# Patient Record
Sex: MALE | Race: WHITE | NOT HISPANIC OR LATINO
[De-identification: names, ages, dates, MRNs, and addresses within clinical notes are randomized per-mention and may not be internally consistent; named-entity substitution may affect disease eponyms.]

---

## 2017-03-03 ENCOUNTER — NON-APPOINTMENT (OUTPATIENT)
Age: 62
End: 2017-03-03

## 2017-03-03 ENCOUNTER — APPOINTMENT (OUTPATIENT)
Dept: INTERNAL MEDICINE | Facility: CLINIC | Age: 62
End: 2017-03-03

## 2017-03-03 VITALS — DIASTOLIC BLOOD PRESSURE: 60 MMHG | SYSTOLIC BLOOD PRESSURE: 120 MMHG

## 2017-03-03 VITALS — HEIGHT: 68 IN | WEIGHT: 204 LBS | BODY MASS INDEX: 30.92 KG/M2 | TEMPERATURE: 98.8 F

## 2017-03-03 RX ORDER — AMOXICILLIN AND CLAVULANATE POTASSIUM 875; 125 MG/1; MG/1
875-125 TABLET, COATED ORAL
Qty: 20 | Refills: 0 | Status: DISCONTINUED | COMMUNITY
Start: 2016-12-13

## 2017-03-06 LAB — BACTERIA NOSE AEROBE CULT: NORMAL

## 2017-03-07 ENCOUNTER — APPOINTMENT (OUTPATIENT)
Dept: INTERNAL MEDICINE | Facility: CLINIC | Age: 62
End: 2017-03-07

## 2017-03-07 ENCOUNTER — LABORATORY RESULT (OUTPATIENT)
Age: 62
End: 2017-03-07

## 2017-03-07 VITALS — WEIGHT: 201 LBS | HEIGHT: 68 IN | TEMPERATURE: 97.4 F | BODY MASS INDEX: 30.46 KG/M2

## 2017-03-07 DIAGNOSIS — Z87.09 PERSONAL HISTORY OF OTHER DISEASES OF THE RESPIRATORY SYSTEM: ICD-10-CM

## 2017-03-08 LAB
ALBUMIN SERPL ELPH-MCNC: 4.4 G/DL
ALP BLD-CCNC: 66 U/L
ALT SERPL-CCNC: 27 U/L
ANION GAP SERPL CALC-SCNC: 19 MMOL/L
AST SERPL-CCNC: 20 U/L
BILIRUB SERPL-MCNC: 0.3 MG/DL
BUN SERPL-MCNC: 22 MG/DL
CALCIUM SERPL-MCNC: 10.7 MG/DL
CHLORIDE SERPL-SCNC: 99 MMOL/L
CHOLEST SERPL-MCNC: 180 MG/DL
CHOLEST/HDLC SERPL: 3.7 RATIO
CK SERPL-CCNC: 57 U/L
CO2 SERPL-SCNC: 26 MMOL/L
CREAT SERPL-MCNC: 1.13 MG/DL
GGT SERPL-CCNC: 55 U/L
HDLC SERPL-MCNC: 49 MG/DL
LDLC SERPL CALC-MCNC: 96 MG/DL
POTASSIUM SERPL-SCNC: 4.3 MMOL/L
PROT SERPL-MCNC: 7.3 G/DL
SAVE SPECIMEN: NORMAL
SODIUM SERPL-SCNC: 144 MMOL/L
TRIGL SERPL-MCNC: 175 MG/DL

## 2017-03-09 ENCOUNTER — RX RENEWAL (OUTPATIENT)
Age: 62
End: 2017-03-09

## 2017-03-11 LAB — BACTERIA THROAT CULT: NORMAL

## 2017-09-03 ENCOUNTER — RX RENEWAL (OUTPATIENT)
Age: 62
End: 2017-09-03

## 2017-11-28 ENCOUNTER — RX RENEWAL (OUTPATIENT)
Age: 62
End: 2017-11-28

## 2018-03-02 ENCOUNTER — RX RENEWAL (OUTPATIENT)
Age: 63
End: 2018-03-02

## 2018-06-19 ENCOUNTER — APPOINTMENT (OUTPATIENT)
Dept: UROLOGY | Facility: CLINIC | Age: 63
End: 2018-06-19
Payer: COMMERCIAL

## 2018-06-19 VITALS
TEMPERATURE: 98.7 F | WEIGHT: 210 LBS | HEART RATE: 97 BPM | RESPIRATION RATE: 16 BRPM | SYSTOLIC BLOOD PRESSURE: 133 MMHG | DIASTOLIC BLOOD PRESSURE: 90 MMHG | HEIGHT: 69 IN | BODY MASS INDEX: 31.1 KG/M2

## 2018-06-19 DIAGNOSIS — Z80.3 FAMILY HISTORY OF MALIGNANT NEOPLASM OF BREAST: ICD-10-CM

## 2018-06-19 DIAGNOSIS — R36.1 HEMATOSPERMIA: ICD-10-CM

## 2018-06-19 DIAGNOSIS — Z85.79 PERSONAL HISTORY OF OTHER MALIGNANT NEOPLASMS OF LYMPHOID, HEMATOPOIETIC AND RELATED TISSUES: ICD-10-CM

## 2018-06-19 DIAGNOSIS — Z84.1 FAMILY HISTORY OF DISORDERS OF KIDNEY AND URETER: ICD-10-CM

## 2018-06-19 PROCEDURE — 99203 OFFICE O/P NEW LOW 30 MIN: CPT

## 2018-06-20 LAB
APPEARANCE: CLEAR
BACTERIA: NEGATIVE
BILIRUBIN URINE: NEGATIVE
BLOOD URINE: NEGATIVE
COLOR: YELLOW
GLUCOSE QUALITATIVE U: NEGATIVE MG/DL
HYALINE CASTS: 1 /LPF
KETONES URINE: NEGATIVE
LEUKOCYTE ESTERASE URINE: NEGATIVE
MICROSCOPIC-UA: NORMAL
NITRITE URINE: NEGATIVE
PH URINE: 5
PROTEIN URINE: NEGATIVE MG/DL
RED BLOOD CELLS URINE: 2 /HPF
SPECIFIC GRAVITY URINE: 1.02
SQUAMOUS EPITHELIAL CELLS: 1 /HPF
UROBILINOGEN URINE: NEGATIVE MG/DL
WHITE BLOOD CELLS URINE: 1 /HPF

## 2018-07-05 ENCOUNTER — RECORD ABSTRACTING (OUTPATIENT)
Age: 63
End: 2018-07-05

## 2018-07-05 ENCOUNTER — APPOINTMENT (OUTPATIENT)
Dept: INTERNAL MEDICINE | Facility: CLINIC | Age: 63
End: 2018-07-05
Payer: COMMERCIAL

## 2018-07-05 PROCEDURE — 36415 COLL VENOUS BLD VENIPUNCTURE: CPT

## 2018-07-07 LAB
PSA SERPL-MCNC: 1.17 NG/ML
SAVE SPECIMEN: NORMAL

## 2018-09-04 ENCOUNTER — RX RENEWAL (OUTPATIENT)
Age: 63
End: 2018-09-04

## 2018-12-28 ENCOUNTER — APPOINTMENT (OUTPATIENT)
Dept: INTERNAL MEDICINE | Facility: CLINIC | Age: 63
End: 2018-12-28
Payer: COMMERCIAL

## 2018-12-28 VITALS
DIASTOLIC BLOOD PRESSURE: 96 MMHG | WEIGHT: 214 LBS | HEIGHT: 69 IN | HEART RATE: 90 BPM | SYSTOLIC BLOOD PRESSURE: 141 MMHG | BODY MASS INDEX: 31.7 KG/M2

## 2018-12-28 PROCEDURE — 99213 OFFICE O/P EST LOW 20 MIN: CPT

## 2018-12-28 RX ORDER — AZITHROMYCIN 250 MG/1
250 TABLET, FILM COATED ORAL
Qty: 1 | Refills: 0 | Status: DISCONTINUED | COMMUNITY
Start: 2017-03-03 | End: 2018-12-28

## 2018-12-28 RX ORDER — IPRATROPIUM BROMIDE 42 UG/1
0.06 SPRAY NASAL
Qty: 15 | Refills: 0 | Status: DISCONTINUED | COMMUNITY
Start: 2016-09-23 | End: 2018-12-28

## 2018-12-28 RX ORDER — ALBUTEROL SULFATE 90 UG/1
108 (90 BASE) AEROSOL, METERED RESPIRATORY (INHALATION)
Qty: 9 | Refills: 0 | Status: DISCONTINUED | COMMUNITY
Start: 2017-02-10 | End: 2018-12-28

## 2018-12-28 RX ORDER — LEVALBUTEROL TARTRATE 45 UG/1
45 AEROSOL, METERED ORAL
Qty: 1 | Refills: 2 | Status: DISCONTINUED | COMMUNITY
Start: 2017-03-03 | End: 2018-12-28

## 2018-12-28 RX ORDER — PREDNISONE 20 MG/1
20 TABLET ORAL
Qty: 10 | Refills: 0 | Status: DISCONTINUED | COMMUNITY
Start: 2016-12-13 | End: 2018-12-28

## 2018-12-28 RX ORDER — PAROXETINE HYDROCHLORIDE 40 MG/1
TABLET, FILM COATED ORAL
Refills: 0 | Status: DISCONTINUED | COMMUNITY
End: 2018-12-28

## 2018-12-28 NOTE — HISTORY OF PRESENT ILLNESS
[FreeTextEntry8] : 63 year old female here today with c.o Sinus like symptoms for almost  a week. \par He is very congested and then it is making him cough. \par He has no sore throat. \par He is taking Flonase which is helping. \par Denies fevers, chills, body aches. \par

## 2018-12-28 NOTE — ASSESSMENT
[FreeTextEntry1] : Acute SInusitis: start azithromycin\par pt to have labs on Wed with Hematology /Oncology \par OTC agents\par flonase

## 2018-12-28 NOTE — PHYSICAL EXAM
[No Acute Distress] : no acute distress [Well Nourished] : well nourished [Well Developed] : well developed [Well-Appearing] : well-appearing [Normal Outer Ear/Nose] : the outer ears and nose were normal in appearance [Normal Oropharynx] : the oropharynx was normal [No Respiratory Distress] : no respiratory distress  [Clear to Auscultation] : lungs were clear to auscultation bilaterally [No Accessory Muscle Use] : no accessory muscle use [Normal Rate] : normal rate  [Regular Rhythm] : with a regular rhythm [Normal S1, S2] : normal S1 and S2 [Normal Affect] : the affect was normal [Normal Insight/Judgement] : insight and judgment were intact

## 2019-01-27 ENCOUNTER — RX RENEWAL (OUTPATIENT)
Age: 64
End: 2019-01-27

## 2019-04-22 ENCOUNTER — RX RENEWAL (OUTPATIENT)
Age: 64
End: 2019-04-22

## 2019-05-03 ENCOUNTER — RX RENEWAL (OUTPATIENT)
Age: 64
End: 2019-05-03

## 2019-05-10 ENCOUNTER — RX CHANGE (OUTPATIENT)
Age: 64
End: 2019-05-10

## 2019-05-10 ENCOUNTER — RX RENEWAL (OUTPATIENT)
Age: 64
End: 2019-05-10

## 2019-05-13 ENCOUNTER — RX CHANGE (OUTPATIENT)
Age: 64
End: 2019-05-13

## 2019-05-14 ENCOUNTER — RX CHANGE (OUTPATIENT)
Age: 64
End: 2019-05-14

## 2019-10-30 ENCOUNTER — RX CHANGE (OUTPATIENT)
Age: 64
End: 2019-10-30

## 2019-12-09 ENCOUNTER — APPOINTMENT (OUTPATIENT)
Dept: INTERNAL MEDICINE | Facility: CLINIC | Age: 64
End: 2019-12-09
Payer: COMMERCIAL

## 2019-12-09 VITALS — HEIGHT: 69 IN | BODY MASS INDEX: 31.7 KG/M2 | WEIGHT: 214 LBS

## 2019-12-09 VITALS — SYSTOLIC BLOOD PRESSURE: 130 MMHG | DIASTOLIC BLOOD PRESSURE: 70 MMHG

## 2019-12-09 DIAGNOSIS — K42.9 UMBILICAL HERNIA W/OUT OBSTRUCTION OR GANGRENE: ICD-10-CM

## 2019-12-09 PROCEDURE — 99214 OFFICE O/P EST MOD 30 MIN: CPT

## 2019-12-09 RX ORDER — AZITHROMYCIN 250 MG/1
250 TABLET, FILM COATED ORAL
Qty: 1 | Refills: 0 | Status: DISCONTINUED | COMMUNITY
Start: 2018-12-28 | End: 2019-12-09

## 2019-12-09 NOTE — HISTORY OF PRESENT ILLNESS
[No Pertinent Cardiac History] : no history of aortic stenosis, atrial fibrillation, coronary artery disease, recent myocardial infarction, or implantable device/pacemaker [No Pertinent Pulmonary History] : no history of asthma, COPD, sleep apnea, or smoking [No Adverse Anesthesia Reaction] : no adverse anesthesia reaction in self or family member [FreeTextEntry1] : umbilical hernia repair [FreeTextEntry3] : Dr Alberto [FreeTextEntry4] : This a patient with a follicular lymphoma which is in remission. He also has a history of hypertension. He presently is being admitted for repair of a large umbilical hernia

## 2019-12-09 NOTE — PHYSICAL EXAM
[Normal] : no posterior cervical lymphadenopathy and no anterior cervical lymphadenopathy [de-identified] : Largeumbilical hernia

## 2019-12-09 NOTE — ASSESSMENT
[FreeTextEntry4] : The patient's vital signs and physical examination was normal aside from a large umbilical hernia. The patient is medically cleared for surgery

## 2019-12-09 NOTE — CONSULT LETTER
[Referral Letter:] : I am referring [unfilled] to you for further evaluation.  My most recent evaluation follows. [FreeTextEntry1] : Umbilical herniorrhaphy KAREN

## 2020-02-05 ENCOUNTER — RX CHANGE (OUTPATIENT)
Age: 65
End: 2020-02-05

## 2020-02-07 ENCOUNTER — RX CHANGE (OUTPATIENT)
Age: 65
End: 2020-02-07

## 2020-03-25 ENCOUNTER — RX RENEWAL (OUTPATIENT)
Age: 65
End: 2020-03-25

## 2020-04-30 ENCOUNTER — RX RENEWAL (OUTPATIENT)
Age: 65
End: 2020-04-30

## 2021-02-24 ENCOUNTER — LABORATORY RESULT (OUTPATIENT)
Age: 66
End: 2021-02-24

## 2021-02-24 ENCOUNTER — APPOINTMENT (OUTPATIENT)
Dept: INTERNAL MEDICINE | Facility: CLINIC | Age: 66
End: 2021-02-24
Payer: COMMERCIAL

## 2021-02-24 VITALS
TEMPERATURE: 96.1 F | WEIGHT: 218 LBS | BODY MASS INDEX: 32.29 KG/M2 | HEIGHT: 69 IN | DIASTOLIC BLOOD PRESSURE: 98 MMHG | SYSTOLIC BLOOD PRESSURE: 140 MMHG

## 2021-02-24 LAB
BILIRUB UR QL STRIP: NORMAL
CLARITY UR: CLEAR
COLLECTION METHOD: NORMAL
GLUCOSE UR-MCNC: NORMAL
HCG UR QL: 0.2 EU/DL
HGB UR QL STRIP.AUTO: NORMAL
KETONES UR-MCNC: NORMAL
LEUKOCYTE ESTERASE UR QL STRIP: NORMAL
NITRITE UR QL STRIP: NORMAL
PH UR STRIP: 5
PROT UR STRIP-MCNC: NORMAL
SP GR UR STRIP: 10.3

## 2021-02-24 PROCEDURE — 99072 ADDL SUPL MATRL&STAF TM PHE: CPT

## 2021-02-24 PROCEDURE — 81003 URINALYSIS AUTO W/O SCOPE: CPT | Mod: NC,QW

## 2021-02-24 PROCEDURE — 36415 COLL VENOUS BLD VENIPUNCTURE: CPT

## 2021-02-24 PROCEDURE — 99213 OFFICE O/P EST LOW 20 MIN: CPT | Mod: 25

## 2021-02-24 RX ORDER — FLUTICASONE PROPIONATE 50 UG/1
50 SPRAY, METERED NASAL TWICE DAILY
Qty: 1 | Refills: 1 | Status: DISCONTINUED | COMMUNITY
Start: 2018-08-25 | End: 2021-02-24

## 2021-02-24 NOTE — PHYSICAL EXAM
[No Focal Deficits] : no focal deficits [Normal] : affect was normal and insight and judgment were intact [de-identified] : Overweight

## 2021-02-24 NOTE — ASSESSMENT
[FreeTextEntry1] : Is a 65-year-old male whose history has been reviewed above\par \par He is indeed is asymptomatic with the exception of his tooth pain.  His cardiovascular examination is normal\par \par His blood pressure indeed is elevated however it is mostly diastolic I got blood pressures which range from 140/102 120/100\par \par We will obtain blood tests as a baseline and increase his medication\par \par At this point I will increase both his diuretic and his losartan\par \par Before I change his prescription I will ask him to just double his medications and have him come back quickly be seen in 48 hours\par \par His urine analysis is negative

## 2021-02-24 NOTE — HISTORY OF PRESENT ILLNESS
[FreeTextEntry8] : This is a 65-year-old male who has not been here in 2 years.  Patient has developed a wisdom tooth and was seen by his dentist who refused to operate on him because of his elevated blood pressure he was told that his blood pressure was 150/102.\par \par He is asymptomatic with the exception of his tooth pain\par \par He does have a history of lymphoma in remission and apparently depression for which he takes an SSRI
detailed exam

## 2021-02-25 ENCOUNTER — LABORATORY RESULT (OUTPATIENT)
Age: 66
End: 2021-02-25

## 2021-02-26 ENCOUNTER — APPOINTMENT (OUTPATIENT)
Dept: INTERNAL MEDICINE | Facility: CLINIC | Age: 66
End: 2021-02-26
Payer: COMMERCIAL

## 2021-02-26 ENCOUNTER — NON-APPOINTMENT (OUTPATIENT)
Age: 66
End: 2021-02-26

## 2021-02-26 VITALS
SYSTOLIC BLOOD PRESSURE: 133 MMHG | DIASTOLIC BLOOD PRESSURE: 94 MMHG | HEIGHT: 69 IN | HEART RATE: 117 BPM | BODY MASS INDEX: 32.29 KG/M2 | WEIGHT: 218 LBS | TEMPERATURE: 97.1 F

## 2021-02-26 PROCEDURE — 36415 COLL VENOUS BLD VENIPUNCTURE: CPT

## 2021-02-26 PROCEDURE — 99214 OFFICE O/P EST MOD 30 MIN: CPT | Mod: 25

## 2021-02-26 PROCEDURE — 93000 ELECTROCARDIOGRAM COMPLETE: CPT

## 2021-02-26 PROCEDURE — 99072 ADDL SUPL MATRL&STAF TM PHE: CPT

## 2021-02-26 NOTE — REVIEW OF SYSTEMS
For information on Fall & Injury Prevention, visit [email or web address]
[Negative] : Gastrointestinal

## 2021-02-26 NOTE — HISTORY OF PRESENT ILLNESS
[de-identified] : Mr. JOSEPH NAQVI is a 65 year old male here today for a follow up of their chronic medical conditions.\par \par Patient recently had dental work(last night) \par \par Patient has no complaints today\par \par his calcium was found to be high \par \par he is taking extra meds

## 2021-02-26 NOTE — ASSESSMENT
[FreeTextEntry1] : Stop HCTZ\par See Endo\par Increase losartan \par Vitamin D\par add metoprolol \par

## 2021-02-26 NOTE — PHYSICAL EXAM
[Regular Rhythm] : with a regular rhythm [Normal S1, S2] : normal S1 and S2 [Normal] : affect was normal and insight and judgment were intact [de-identified] : tachycardia

## 2021-02-27 LAB
ALBUMIN SERPL ELPH-MCNC: 5.1 G/DL
ALP BLD-CCNC: 73 U/L
ALT SERPL-CCNC: 56 U/L
ANION GAP SERPL CALC-SCNC: 21 MMOL/L
AST SERPL-CCNC: 32 U/L
BILIRUB SERPL-MCNC: 0.6 MG/DL
BUN SERPL-MCNC: 18 MG/DL
CALCIUM SERPL-MCNC: 11.7 MG/DL
CHLORIDE SERPL-SCNC: 97 MMOL/L
CO2 SERPL-SCNC: 21 MMOL/L
CREAT SERPL-MCNC: 1.5 MG/DL
GLUCOSE SERPL-MCNC: 112 MG/DL
POTASSIUM SERPL-SCNC: 4.7 MMOL/L
PROT SERPL-MCNC: 7.9 G/DL
PSA SERPL-MCNC: 1.2 NG/ML
SAVE SPECIMEN: NORMAL
SODIUM SERPL-SCNC: 139 MMOL/L

## 2021-03-01 LAB
25(OH)D3 SERPL-MCNC: 16.6 NG/ML
ALBUMIN SERPL ELPH-MCNC: 4.8 G/DL
ALP BLD-CCNC: 76 U/L
ALT SERPL-CCNC: 51 U/L
ANION GAP SERPL CALC-SCNC: 18 MMOL/L
AST SERPL-CCNC: 28 U/L
BASOPHILS # BLD AUTO: 0.04 K/UL
BASOPHILS NFR BLD AUTO: 0.4 %
BILIRUB SERPL-MCNC: 0.3 MG/DL
BUN SERPL-MCNC: 20 MG/DL
CALCIUM SERPL-MCNC: 10.8 MG/DL
CALCIUM SERPL-MCNC: 10.9 MG/DL
CELIACPAN: NORMAL
CHLORIDE SERPL-SCNC: 101 MMOL/L
CHOLEST SERPL-MCNC: 227 MG/DL
CO2 SERPL-SCNC: 22 MMOL/L
CREAT SERPL-MCNC: 1.26 MG/DL
EOSINOPHIL # BLD AUTO: 0.16 K/UL
EOSINOPHIL NFR BLD AUTO: 1.6 %
ESTIMATED AVERAGE GLUCOSE: 126 MG/DL
GLUCOSE SERPL-MCNC: 106 MG/DL
HBA1C MFR BLD HPLC: 6 %
HCT VFR BLD CALC: 50.6 %
HDLC SERPL-MCNC: 43 MG/DL
HGB BLD-MCNC: 16.5 G/DL
IMM GRANULOCYTES NFR BLD AUTO: 0.2 %
LDLC SERPL CALC-MCNC: 147 MG/DL
LYMPHOCYTES # BLD AUTO: 1.92 K/UL
LYMPHOCYTES NFR BLD AUTO: 18.6 %
MAN DIFF?: NORMAL
MCHC RBC-ENTMCNC: 28.1 PG
MCHC RBC-ENTMCNC: 32.6 GM/DL
MCV RBC AUTO: 86.1 FL
MONOCYTES # BLD AUTO: 0.78 K/UL
MONOCYTES NFR BLD AUTO: 7.6 %
NEUTROPHILS # BLD AUTO: 7.38 K/UL
NEUTROPHILS NFR BLD AUTO: 71.6 %
NONHDLC SERPL-MCNC: 183 MG/DL
PARATHYROID HORMONE INTACT: 87 PG/ML
PLATELET # BLD AUTO: 336 K/UL
POTASSIUM SERPL-SCNC: 4.4 MMOL/L
PROT SERPL-MCNC: 7.3 G/DL
RBC # BLD: 5.88 M/UL
RBC # FLD: 13.5 %
SODIUM SERPL-SCNC: 140 MMOL/L
T3RU NFR SERPL: 1.2 TBI
T4 SERPL-MCNC: 8.1 UG/DL
TRIGL SERPL-MCNC: 182 MG/DL
TSH SERPL-ACNC: 3.46 UIU/ML
URATE SERPL-MCNC: 9.3 MG/DL
WBC # FLD AUTO: 10.3 K/UL

## 2021-03-02 ENCOUNTER — APPOINTMENT (OUTPATIENT)
Dept: ENDOCRINOLOGY | Facility: CLINIC | Age: 66
End: 2021-03-02
Payer: COMMERCIAL

## 2021-03-02 VITALS
TEMPERATURE: 97.5 F | WEIGHT: 216 LBS | DIASTOLIC BLOOD PRESSURE: 83 MMHG | SYSTOLIC BLOOD PRESSURE: 138 MMHG | OXYGEN SATURATION: 98 % | HEIGHT: 69 IN | HEART RATE: 98 BPM | BODY MASS INDEX: 31.99 KG/M2

## 2021-03-02 DIAGNOSIS — R79.89 OTHER SPECIFIED ABNORMAL FINDINGS OF BLOOD CHEMISTRY: ICD-10-CM

## 2021-03-02 PROCEDURE — 99244 OFF/OP CNSLTJ NEW/EST MOD 40: CPT | Mod: 25

## 2021-03-02 PROCEDURE — 36415 COLL VENOUS BLD VENIPUNCTURE: CPT

## 2021-03-02 PROCEDURE — 99072 ADDL SUPL MATRL&STAF TM PHE: CPT

## 2021-03-06 NOTE — HISTORY OF PRESENT ILLNESS
[FreeTextEntry1] : Mr. NAQVI  is a 65 year old  male who presents for initial endocrine evaluation. He presents with regard to a history of \par He presents via the courtesy of Jamee Wood NP and . Serum calcium has been mildly elevated over the past several years at around 10.7 with PTH intact from 02/25/2021 returning at 88, c/w Primary HPT. He denies constipation, polyuria muscle or bony aches.  He too denies hx of kidney stones or bony fractures. He has not had a bone density study.\par \par Additional medical history includes that of hypertension and sinus tachycardia. Too, has hx of non-Hodgkin's lymphoma. Has been cancer free since 2009. Pt reports stopping HCTZ on Friday. Takes Losartan. \par Hx of wisdom teeth extraction.\par

## 2021-03-19 ENCOUNTER — NON-APPOINTMENT (OUTPATIENT)
Age: 66
End: 2021-03-19

## 2021-03-19 ENCOUNTER — APPOINTMENT (OUTPATIENT)
Dept: INTERNAL MEDICINE | Facility: CLINIC | Age: 66
End: 2021-03-19
Payer: COMMERCIAL

## 2021-03-19 ENCOUNTER — LABORATORY RESULT (OUTPATIENT)
Age: 66
End: 2021-03-19

## 2021-03-19 VITALS — HEIGHT: 69 IN | WEIGHT: 212 LBS | TEMPERATURE: 97.6 F | BODY MASS INDEX: 31.4 KG/M2

## 2021-03-19 VITALS — DIASTOLIC BLOOD PRESSURE: 80 MMHG | SYSTOLIC BLOOD PRESSURE: 130 MMHG

## 2021-03-19 DIAGNOSIS — Z87.19 PERSONAL HISTORY OF OTHER DISEASES OF THE DIGESTIVE SYSTEM: ICD-10-CM

## 2021-03-19 DIAGNOSIS — Z86.39 PERSONAL HISTORY OF OTHER ENDOCRINE, NUTRITIONAL AND METABOLIC DISEASE: ICD-10-CM

## 2021-03-19 DIAGNOSIS — R74.01 ELEVATION OF LEVELS OF LIVER TRANSAMINASE LEVELS: ICD-10-CM

## 2021-03-19 DIAGNOSIS — K40.90 UNILATERAL INGUINAL HERNIA, W/OUT OBSTRUCTION OR GANGRENE, NOT SPECIFIED AS RECURRENT: ICD-10-CM

## 2021-03-19 PROCEDURE — 36415 COLL VENOUS BLD VENIPUNCTURE: CPT

## 2021-03-19 PROCEDURE — 93000 ELECTROCARDIOGRAM COMPLETE: CPT

## 2021-03-19 PROCEDURE — 99396 PREV VISIT EST AGE 40-64: CPT | Mod: 25

## 2021-03-19 PROCEDURE — 99072 ADDL SUPL MATRL&STAF TM PHE: CPT

## 2021-03-19 RX ORDER — VITAMIN B COMPLEX
CAPSULE ORAL DAILY
Refills: 0 | Status: ACTIVE | COMMUNITY
Start: 2021-03-19

## 2021-03-19 RX ORDER — FOLIC ACID 1 MG/1
1 TABLET ORAL DAILY
Qty: 90 | Refills: 3 | Status: ACTIVE | COMMUNITY
Start: 2021-03-19 | End: 1900-01-01

## 2021-03-19 NOTE — PHYSICAL EXAM
[No Mass] : no mass [Testes Mass (___cm)] : there were no testicular masses [Prostate Enlargement] : the prostate was not enlarged [Prostate Tenderness] : the prostate was not tender [Normal] : affect was normal and insight and judgment were intact [de-identified] : left inquina hernia

## 2021-03-19 NOTE — ASSESSMENT
[FreeTextEntry1] : Problems\par Hypertension\par Lymphoma\par Mood disorder\par Hyperparathyroidism\par Assessment\par This is a patient who has a history of hypertension, follicular lymphoma which is resolved primary hyperparathyroidism and of mood disorder.  His blood pressure was normal his physical examination was well controlled.  His EKG did not reveal any acute changes

## 2021-03-19 NOTE — HEALTH RISK ASSESSMENT
[No] : No [No falls in past year] : Patient reported no falls in the past year [0] : 2) Feeling down, depressed, or hopeless: Not at all (0) [Fully functional (bathing, dressing, toileting, transferring, walking, feeding)] : Fully functional (bathing, dressing, toileting, transferring, walking, feeding) [Fully functional (using the telephone, shopping, preparing meals, housekeeping, doing laundry, using] : Fully functional and needs no help or supervision to perform IADLs (using the telephone, shopping, preparing meals, housekeeping, doing laundry, using transportation, managing medications and managing finances) [] : No [[id number]] : 0

## 2021-03-19 NOTE — HISTORY OF PRESENT ILLNESS
[de-identified] : This is a 65-year-old gentleman with a history of hypertension, previous history of follicular lymphoma, mood disorder hypercalcemia who is here today for his annual well examination

## 2021-03-20 LAB
25(OH)D3 SERPL-MCNC: 17.8 NG/ML
ALBUMIN SERPL ELPH-MCNC: 4.4 G/DL
ALP BLD-CCNC: 76 U/L
ALT SERPL-CCNC: 46 U/L
ANION GAP SERPL CALC-SCNC: 12 MMOL/L
APPEARANCE: CLEAR
AST SERPL-CCNC: 26 U/L
BACTERIA: NEGATIVE
BASOPHILS # BLD AUTO: 0.03 K/UL
BASOPHILS NFR BLD AUTO: 0.4 %
BILIRUB SERPL-MCNC: 0.3 MG/DL
BILIRUBIN URINE: NEGATIVE
BLOOD URINE: NEGATIVE
BUN SERPL-MCNC: 14 MG/DL
CALCIUM SERPL-MCNC: 10.7 MG/DL
CHLORIDE SERPL-SCNC: 106 MMOL/L
CHOLEST SERPL-MCNC: 204 MG/DL
CO2 SERPL-SCNC: 28 MMOL/L
COLOR: NORMAL
CREAT SERPL-MCNC: 1.16 MG/DL
EOSINOPHIL # BLD AUTO: 0.15 K/UL
EOSINOPHIL NFR BLD AUTO: 1.9 %
ESTIMATED AVERAGE GLUCOSE: 120 MG/DL
FOLATE SERPL-MCNC: 3.8 NG/ML
GLUCOSE QUALITATIVE U: NEGATIVE
GLUCOSE SERPL-MCNC: 109 MG/DL
HBA1C MFR BLD HPLC: 5.8 %
HCT VFR BLD CALC: 47.5 %
HDLC SERPL-MCNC: 36 MG/DL
HGB BLD-MCNC: 15.1 G/DL
HYALINE CASTS: 2 /LPF
IMM GRANULOCYTES NFR BLD AUTO: 0.4 %
KETONES URINE: NEGATIVE
LDLC SERPL CALC-MCNC: 124 MG/DL
LEUKOCYTE ESTERASE URINE: NEGATIVE
LYMPHOCYTES # BLD AUTO: 1.53 K/UL
LYMPHOCYTES NFR BLD AUTO: 19.3 %
MAN DIFF?: NORMAL
MCHC RBC-ENTMCNC: 27.9 PG
MCHC RBC-ENTMCNC: 31.8 GM/DL
MCV RBC AUTO: 87.6 FL
MICROSCOPIC-UA: NORMAL
MONOCYTES # BLD AUTO: 0.67 K/UL
MONOCYTES NFR BLD AUTO: 8.4 %
NEUTROPHILS # BLD AUTO: 5.53 K/UL
NEUTROPHILS NFR BLD AUTO: 69.6 %
NITRITE URINE: NEGATIVE
NONHDLC SERPL-MCNC: 168 MG/DL
PH URINE: 5.5
PLATELET # BLD AUTO: 290 K/UL
POTASSIUM SERPL-SCNC: 5 MMOL/L
PROT SERPL-MCNC: 6.6 G/DL
PROTEIN URINE: NORMAL
PSA SERPL-MCNC: 1.23 NG/ML
RBC # BLD: 5.42 M/UL
RBC # FLD: 13.7 %
RED BLOOD CELLS URINE: 1 /HPF
SAVE SPECIMEN: NORMAL
SODIUM SERPL-SCNC: 146 MMOL/L
SPECIFIC GRAVITY URINE: 1.02
SQUAMOUS EPITHELIAL CELLS: 0 /HPF
T3RU NFR SERPL: 1.2 TBI
T4 SERPL-MCNC: 7.3 UG/DL
TRIGL SERPL-MCNC: 219 MG/DL
TSH SERPL-ACNC: 3.17 UIU/ML
URATE SERPL-MCNC: 6.5 MG/DL
UROBILINOGEN URINE: NORMAL
VIT B12 SERPL-MCNC: 542 PG/ML
WBC # FLD AUTO: 7.94 K/UL
WHITE BLOOD CELLS URINE: 2 /HPF

## 2021-03-22 ENCOUNTER — RX CHANGE (OUTPATIENT)
Age: 66
End: 2021-03-22

## 2021-03-22 LAB
CALCIUM SERPL-MCNC: 11.2 MG/DL
PARATHYROID HORMONE INTACT: 63 PG/ML

## 2021-03-23 ENCOUNTER — RX CHANGE (OUTPATIENT)
Age: 66
End: 2021-03-23

## 2021-03-29 ENCOUNTER — RX RENEWAL (OUTPATIENT)
Age: 66
End: 2021-03-29

## 2021-04-20 ENCOUNTER — OUTPATIENT (OUTPATIENT)
Dept: OUTPATIENT SERVICES | Facility: HOSPITAL | Age: 66
LOS: 1 days | End: 2021-04-20
Payer: COMMERCIAL

## 2021-04-20 ENCOUNTER — APPOINTMENT (OUTPATIENT)
Dept: RADIOLOGY | Facility: CLINIC | Age: 66
End: 2021-04-20
Payer: COMMERCIAL

## 2021-04-20 DIAGNOSIS — Z86.39 PERSONAL HISTORY OF OTHER ENDOCRINE, NUTRITIONAL AND METABOLIC DISEASE: ICD-10-CM

## 2021-04-20 PROCEDURE — 77085 DXA BONE DENSITY AXL VRT FX: CPT | Mod: 26

## 2021-04-20 PROCEDURE — 77085 DXA BONE DENSITY AXL VRT FX: CPT

## 2021-04-21 ENCOUNTER — NON-APPOINTMENT (OUTPATIENT)
Age: 66
End: 2021-04-21

## 2021-04-24 LAB
24R-OH-CALCIDIOL SERPL-MCNC: 31.7 PG/ML
25(OH)D3 SERPL-MCNC: 20.3 NG/ML
ACE BLD-CCNC: 34 U/L
ALBUMIN SERPL ELPH-MCNC: 4.4 G/DL
ALP BLD-CCNC: 58 U/L
ALT SERPL-CCNC: 36 U/L
ANION GAP SERPL CALC-SCNC: 12 MMOL/L
AST SERPL-CCNC: 23 U/L
BILIRUB SERPL-MCNC: 0.4 MG/DL
BUN SERPL-MCNC: 17 MG/DL
CA-I SERPL-SCNC: 1.33 MMOL/L
CALCIUM SERPL-MCNC: 10.6 MG/DL
CALCIUM SERPL-MCNC: 10.6 MG/DL
CAU: 34 MG/DL
CHLORIDE SERPL-SCNC: 102 MMOL/L
CO2 SERPL-SCNC: 28 MMOL/L
CREAT 24H UR-MCNC: 1.7 G/24 H
CREAT 24H UR-MCNC: 1.7 G/24 H
CREAT ?TM UR-MCNC: 105 MG/DL
CREAT ?TM UR-MCNC: 105 MG/DL
CREAT SERPL-MCNC: 1.22 MG/DL
ESTIMATED AVERAGE GLUCOSE: 123 MG/DL
GLUCOSE SERPL-MCNC: 98 MG/DL
HBA1C MFR BLD HPLC: 5.9 %
MAGNESIUM SERPL-MCNC: 2.5 MG/DL
PARATHYROID HORMONE INTACT: 95 PG/ML
PHOSPHATE SERPL-MCNC: 2.2 MG/DL
POTASSIUM SERPL-SCNC: 4.1 MMOL/L
PROT ?TM UR-MCNC: 24 HR
PROT ?TM UR-MCNC: 24 HR
PROT SERPL-MCNC: 6.8 G/DL
PTH RELATED PROT SERPL-MCNC: <2 PMOL/L
SODIUM SERPL-SCNC: 142 MMOL/L
SPECIMEN VOL 24H UR: 1625 ML
SPECIMEN VOL 24H UR: 1625 ML
SPECIMEN VOL 24H UR: 552 MG/24 H
T3FREE SERPL-MCNC: 3.65 PG/ML
T4 FREE SERPL-MCNC: 1.2 NG/DL
TSH SERPL-ACNC: 2.5 UIU/ML

## 2021-04-25 ENCOUNTER — RX RENEWAL (OUTPATIENT)
Age: 66
End: 2021-04-25

## 2021-05-04 ENCOUNTER — NON-APPOINTMENT (OUTPATIENT)
Age: 66
End: 2021-05-04

## 2021-09-19 ENCOUNTER — RX RENEWAL (OUTPATIENT)
Age: 66
End: 2021-09-19

## 2022-03-17 ENCOUNTER — RX RENEWAL (OUTPATIENT)
Age: 67
End: 2022-03-17

## 2022-03-30 ENCOUNTER — RX RENEWAL (OUTPATIENT)
Age: 67
End: 2022-03-30

## 2022-04-06 ENCOUNTER — RX RENEWAL (OUTPATIENT)
Age: 67
End: 2022-04-06

## 2022-04-06 RX ORDER — SILDENAFIL 100 MG/1
100 TABLET, FILM COATED ORAL
Qty: 6 | Refills: 11 | Status: ACTIVE | COMMUNITY
Start: 2021-03-19 | End: 1900-01-01

## 2022-05-03 ENCOUNTER — RX RENEWAL (OUTPATIENT)
Age: 67
End: 2022-05-03

## 2022-05-04 ENCOUNTER — APPOINTMENT (OUTPATIENT)
Dept: INTERNAL MEDICINE | Facility: CLINIC | Age: 67
End: 2022-05-04

## 2022-09-23 ENCOUNTER — RX RENEWAL (OUTPATIENT)
Age: 67
End: 2022-09-23

## 2023-03-02 ENCOUNTER — LABORATORY RESULT (OUTPATIENT)
Age: 68
End: 2023-03-02

## 2023-03-02 ENCOUNTER — NON-APPOINTMENT (OUTPATIENT)
Age: 68
End: 2023-03-02

## 2023-03-02 ENCOUNTER — APPOINTMENT (OUTPATIENT)
Dept: INTERNAL MEDICINE | Facility: CLINIC | Age: 68
End: 2023-03-02
Payer: COMMERCIAL

## 2023-03-02 VITALS — HEIGHT: 69 IN | BODY MASS INDEX: 31.4 KG/M2 | WEIGHT: 212 LBS

## 2023-03-02 VITALS — SYSTOLIC BLOOD PRESSURE: 160 MMHG | DIASTOLIC BLOOD PRESSURE: 100 MMHG

## 2023-03-02 PROCEDURE — 99397 PER PM REEVAL EST PAT 65+ YR: CPT | Mod: 25

## 2023-03-02 PROCEDURE — 93000 ELECTROCARDIOGRAM COMPLETE: CPT | Mod: 59

## 2023-03-02 PROCEDURE — 36415 COLL VENOUS BLD VENIPUNCTURE: CPT

## 2023-03-02 RX ORDER — HYDROCHLOROTHIAZIDE 12.5 MG/1
12.5 TABLET ORAL
Qty: 90 | Refills: 3 | Status: DISCONTINUED | COMMUNITY
Start: 2021-04-25 | End: 2023-03-02

## 2023-03-03 LAB
ALBUMIN SERPL ELPH-MCNC: 4.7 G/DL
ALP BLD-CCNC: 79 U/L
ALT SERPL-CCNC: 29 U/L
ANION GAP SERPL CALC-SCNC: 13 MMOL/L
APPEARANCE: CLEAR
AST SERPL-CCNC: 20 U/L
BACTERIA: NEGATIVE
BASOPHILS # BLD AUTO: 0.05 K/UL
BASOPHILS NFR BLD AUTO: 0.5 %
BILIRUB SERPL-MCNC: 0.4 MG/DL
BILIRUBIN URINE: NEGATIVE
BLOOD URINE: NEGATIVE
BUN SERPL-MCNC: 13 MG/DL
CALCIUM SERPL-MCNC: 11.3 MG/DL
CHLORIDE SERPL-SCNC: 102 MMOL/L
CHOLEST SERPL-MCNC: 233 MG/DL
CO2 SERPL-SCNC: 26 MMOL/L
COLOR: YELLOW
CREAT SERPL-MCNC: 1.15 MG/DL
EGFR: 70 ML/MIN/1.73M2
EOSINOPHIL # BLD AUTO: 0.12 K/UL
EOSINOPHIL NFR BLD AUTO: 1.2 %
ESTIMATED AVERAGE GLUCOSE: 117 MG/DL
FERRITIN SERPL-MCNC: 76 NG/ML
FOLATE SERPL-MCNC: 4.9 NG/ML
GLUCOSE QUALITATIVE U: NEGATIVE
GLUCOSE SERPL-MCNC: 105 MG/DL
HBA1C MFR BLD HPLC: 5.7 %
HCT VFR BLD CALC: 48.1 %
HDLC SERPL-MCNC: 40 MG/DL
HGB BLD-MCNC: 15.6 G/DL
HYALINE CASTS: 0 /LPF
IMM GRANULOCYTES NFR BLD AUTO: 0.3 %
KETONES URINE: NEGATIVE
LDLC SERPL CALC-MCNC: 142 MG/DL
LEUKOCYTE ESTERASE URINE: NEGATIVE
LYMPHOCYTES # BLD AUTO: 1.96 K/UL
LYMPHOCYTES NFR BLD AUTO: 20 %
MAN DIFF?: NORMAL
MCHC RBC-ENTMCNC: 27.6 PG
MCHC RBC-ENTMCNC: 32.4 GM/DL
MCV RBC AUTO: 85 FL
MICROSCOPIC-UA: NORMAL
MONOCYTES # BLD AUTO: 0.77 K/UL
MONOCYTES NFR BLD AUTO: 7.8 %
NEUTROPHILS # BLD AUTO: 6.88 K/UL
NEUTROPHILS NFR BLD AUTO: 70.2 %
NITRITE URINE: NEGATIVE
NONHDLC SERPL-MCNC: 193 MG/DL
PH URINE: 6
PLATELET # BLD AUTO: 328 K/UL
POTASSIUM SERPL-SCNC: 4.5 MMOL/L
PROT SERPL-MCNC: 7.6 G/DL
PROTEIN URINE: NORMAL
PSA SERPL-MCNC: 1.17 NG/ML
RBC # BLD: 5.66 M/UL
RBC # FLD: 14.1 %
RED BLOOD CELLS URINE: 1 /HPF
SODIUM SERPL-SCNC: 141 MMOL/L
SPECIFIC GRAVITY URINE: 1.02
SQUAMOUS EPITHELIAL CELLS: 0 /HPF
T3RU NFR SERPL: 1.2 TBI
T4 SERPL-MCNC: 8.2 UG/DL
TRIGL SERPL-MCNC: 254 MG/DL
TSH SERPL-ACNC: 3.07 UIU/ML
URATE SERPL-MCNC: 7.6 MG/DL
UROBILINOGEN URINE: NORMAL
VIT B12 SERPL-MCNC: 521 PG/ML
WBC # FLD AUTO: 9.81 K/UL
WHITE BLOOD CELLS URINE: 2 /HPF

## 2023-03-10 ENCOUNTER — APPOINTMENT (OUTPATIENT)
Dept: INTERNAL MEDICINE | Facility: CLINIC | Age: 68
End: 2023-03-10
Payer: COMMERCIAL

## 2023-03-10 VITALS
BODY MASS INDEX: 31.4 KG/M2 | WEIGHT: 212 LBS | SYSTOLIC BLOOD PRESSURE: 140 MMHG | HEIGHT: 69 IN | DIASTOLIC BLOOD PRESSURE: 80 MMHG

## 2023-03-10 PROCEDURE — 99213 OFFICE O/P EST LOW 20 MIN: CPT

## 2023-03-10 NOTE — HISTORY OF PRESENT ILLNESS
[de-identified] : This is a 67-year-old gentleman with a history of follicular lymphoma, hypercholesterolemia, who recently was found to be significantly hypertensive.  He was started on losartan 100 mg plus Lasix 20 mg daily and is here for a follow up visit

## 2023-03-10 NOTE — ASSESSMENT
[FreeTextEntry1] : Problems\par Hypertension-the patient was advised to lose weight.  I also placed him on a salt restricted diet and advised him to continue his Lasix 20 mg daily and his losartan 100 mg daily.  And to return in 3 months\par Follicular lymphoma-I referred him to hematology oncology\par Hypercholesterolemia-he is to continue on his statin the patient was advised as to the risks of transaminitis and myositis and to call me immediately should any of the symptoms develop and to stop his medications

## 2023-03-10 NOTE — PHYSICAL EXAM
[No Mass] : no mass [Testes Mass (___cm)] : there were no testicular masses [Prostate Enlargement] : the prostate was not enlarged [Prostate Tenderness] : the prostate was not tender [Normal] : affect was normal and insight and judgment were intact [de-identified] : left inquina hernia

## 2023-03-10 NOTE — HEALTH RISK ASSESSMENT
[No] : No [No falls in past year] : Patient reported no falls in the past year [0] : 2) Feeling down, depressed, or hopeless: Not at all (0) [PHQ-2 Negative - No further assessment needed] : PHQ-2 Negative - No further assessment needed [Fully functional (bathing, dressing, toileting, transferring, walking, feeding)] : Fully functional (bathing, dressing, toileting, transferring, walking, feeding) [Fully functional (using the telephone, shopping, preparing meals, housekeeping, doing laundry, using] : Fully functional and needs no help or supervision to perform IADLs (using the telephone, shopping, preparing meals, housekeeping, doing laundry, using transportation, managing medications and managing finances) [[id number]] : 0

## 2023-03-10 NOTE — HISTORY OF PRESENT ILLNESS
[de-identified] : This is a 67-year-old gentleman with a history of hypertension, previous history of follicular lymphoma, mood disorder hypercalcemia who is here today for his annual well examination

## 2023-03-10 NOTE — PHYSICAL EXAM
[No Mass] : no mass [Testes Mass (___cm)] : there were no testicular masses [Prostate Enlargement] : the prostate was not enlarged [Prostate Tenderness] : the prostate was not tender [Normal] : affect was normal and insight and judgment were intact [de-identified] : left inquina hernia

## 2023-03-10 NOTE — ASSESSMENT
[FreeTextEntry1] : Problems\par Hypertension\par Lymphoma\par Mood disorder\par Hyperparathyroidism\par Assessment\par This is a patient who has a history of hypertension, follicular lymphoma which is resolved primary hyperparathyroidism and of mood disorder.  His blood pressure today was elevated at 160/100.  In addition the patient has gained a significant amount of weight.  I placed the patient on a low-sodium diet and increased his losartan to 100 mg daily.  And started him on furosemide 20 mg a day.  I did not choose hydrochlorothiazide because the patient became hypercalcemic  last time this was done.  I also advised him to lose weight and exercise and he is to see me next week

## 2023-03-26 ENCOUNTER — RX RENEWAL (OUTPATIENT)
Age: 68
End: 2023-03-26

## 2023-03-27 ENCOUNTER — RX CHANGE (OUTPATIENT)
Age: 68
End: 2023-03-27

## 2023-04-17 ENCOUNTER — EMERGENCY (EMERGENCY)
Facility: HOSPITAL | Age: 68
LOS: 1 days | Discharge: HOME | End: 2023-04-17
Attending: EMERGENCY MEDICINE
Payer: COMMERCIAL

## 2023-04-17 VITALS
HEART RATE: 100 BPM | RESPIRATION RATE: 17 BRPM | OXYGEN SATURATION: 96 % | TEMPERATURE: 98 F | DIASTOLIC BLOOD PRESSURE: 98 MMHG | SYSTOLIC BLOOD PRESSURE: 159 MMHG

## 2023-04-17 VITALS
SYSTOLIC BLOOD PRESSURE: 136 MMHG | RESPIRATION RATE: 16 BRPM | WEIGHT: 210.1 LBS | DIASTOLIC BLOOD PRESSURE: 95 MMHG | HEIGHT: 69 IN | TEMPERATURE: 99 F | HEART RATE: 114 BPM | OXYGEN SATURATION: 96 %

## 2023-04-17 LAB
ALBUMIN SERPL ELPH-MCNC: 4.1 G/DL — SIGNIFICANT CHANGE UP (ref 3.3–5)
ALP SERPL-CCNC: 73 U/L — SIGNIFICANT CHANGE UP (ref 40–120)
ALT FLD-CCNC: 19 U/L — SIGNIFICANT CHANGE UP (ref 10–45)
ANION GAP SERPL CALC-SCNC: 11 MMOL/L — SIGNIFICANT CHANGE UP (ref 5–17)
APTT BLD: 26.6 SEC — LOW (ref 27.5–35.5)
AST SERPL-CCNC: 11 U/L — SIGNIFICANT CHANGE UP (ref 10–40)
BASOPHILS # BLD AUTO: 0.04 K/UL — SIGNIFICANT CHANGE UP (ref 0–0.2)
BASOPHILS NFR BLD AUTO: 0.3 % — SIGNIFICANT CHANGE UP (ref 0–2)
BILIRUB SERPL-MCNC: 0.5 MG/DL — SIGNIFICANT CHANGE UP (ref 0.2–1.2)
BUN SERPL-MCNC: 13 MG/DL — SIGNIFICANT CHANGE UP (ref 7–23)
CALCIUM SERPL-MCNC: 10.5 MG/DL — SIGNIFICANT CHANGE UP (ref 8.4–10.5)
CHLORIDE SERPL-SCNC: 102 MMOL/L — SIGNIFICANT CHANGE UP (ref 96–108)
CO2 SERPL-SCNC: 26 MMOL/L — SIGNIFICANT CHANGE UP (ref 22–31)
CREAT SERPL-MCNC: 1.01 MG/DL — SIGNIFICANT CHANGE UP (ref 0.5–1.3)
EGFR: 82 ML/MIN/1.73M2 — SIGNIFICANT CHANGE UP
EOSINOPHIL # BLD AUTO: 0.14 K/UL — SIGNIFICANT CHANGE UP (ref 0–0.5)
EOSINOPHIL NFR BLD AUTO: 1.2 % — SIGNIFICANT CHANGE UP (ref 0–6)
GLUCOSE SERPL-MCNC: 109 MG/DL — HIGH (ref 70–99)
HCT VFR BLD CALC: 41.6 % — SIGNIFICANT CHANGE UP (ref 39–50)
HGB BLD-MCNC: 13.5 G/DL — SIGNIFICANT CHANGE UP (ref 13–17)
IMM GRANULOCYTES NFR BLD AUTO: 0.5 % — SIGNIFICANT CHANGE UP (ref 0–0.9)
INR BLD: 1.2 RATIO — HIGH (ref 0.88–1.16)
LYMPHOCYTES # BLD AUTO: 1.58 K/UL — SIGNIFICANT CHANGE UP (ref 1–3.3)
LYMPHOCYTES # BLD AUTO: 13.3 % — SIGNIFICANT CHANGE UP (ref 13–44)
MCHC RBC-ENTMCNC: 27.6 PG — SIGNIFICANT CHANGE UP (ref 27–34)
MCHC RBC-ENTMCNC: 32.5 GM/DL — SIGNIFICANT CHANGE UP (ref 32–36)
MCV RBC AUTO: 84.9 FL — SIGNIFICANT CHANGE UP (ref 80–100)
MONOCYTES # BLD AUTO: 1.16 K/UL — HIGH (ref 0–0.9)
MONOCYTES NFR BLD AUTO: 9.8 % — SIGNIFICANT CHANGE UP (ref 2–14)
NEUTROPHILS # BLD AUTO: 8.89 K/UL — HIGH (ref 1.8–7.4)
NEUTROPHILS NFR BLD AUTO: 74.9 % — SIGNIFICANT CHANGE UP (ref 43–77)
NRBC # BLD: 0 /100 WBCS — SIGNIFICANT CHANGE UP (ref 0–0)
PLATELET # BLD AUTO: 277 K/UL — SIGNIFICANT CHANGE UP (ref 150–400)
POTASSIUM SERPL-MCNC: 4.2 MMOL/L — SIGNIFICANT CHANGE UP (ref 3.5–5.3)
POTASSIUM SERPL-SCNC: 4.2 MMOL/L — SIGNIFICANT CHANGE UP (ref 3.5–5.3)
PROT SERPL-MCNC: 6.7 G/DL — SIGNIFICANT CHANGE UP (ref 6–8.3)
PROTHROM AB SERPL-ACNC: 14 SEC — HIGH (ref 10.5–13.4)
RBC # BLD: 4.9 M/UL — SIGNIFICANT CHANGE UP (ref 4.2–5.8)
RBC # FLD: 14.4 % — SIGNIFICANT CHANGE UP (ref 10.3–14.5)
SARS-COV-2 RNA SPEC QL NAA+PROBE: SIGNIFICANT CHANGE UP
SODIUM SERPL-SCNC: 139 MMOL/L — SIGNIFICANT CHANGE UP (ref 135–145)
WBC # BLD: 11.87 K/UL — HIGH (ref 3.8–10.5)
WBC # FLD AUTO: 11.87 K/UL — HIGH (ref 3.8–10.5)

## 2023-04-17 PROCEDURE — 85610 PROTHROMBIN TIME: CPT

## 2023-04-17 PROCEDURE — 85025 COMPLETE CBC W/AUTO DIFF WBC: CPT

## 2023-04-17 PROCEDURE — 93971 EXTREMITY STUDY: CPT

## 2023-04-17 PROCEDURE — 93971 EXTREMITY STUDY: CPT | Mod: 26,LT

## 2023-04-17 PROCEDURE — 36415 COLL VENOUS BLD VENIPUNCTURE: CPT

## 2023-04-17 PROCEDURE — 99285 EMERGENCY DEPT VISIT HI MDM: CPT | Mod: 25

## 2023-04-17 PROCEDURE — U0005: CPT

## 2023-04-17 PROCEDURE — 80053 COMPREHEN METABOLIC PANEL: CPT

## 2023-04-17 PROCEDURE — U0003: CPT

## 2023-04-17 PROCEDURE — 99284 EMERGENCY DEPT VISIT MOD MDM: CPT

## 2023-04-17 PROCEDURE — 93005 ELECTROCARDIOGRAM TRACING: CPT

## 2023-04-17 PROCEDURE — 85730 THROMBOPLASTIN TIME PARTIAL: CPT

## 2023-04-17 RX ORDER — ACETAMINOPHEN 500 MG
975 TABLET ORAL ONCE
Refills: 0 | Status: COMPLETED | OUTPATIENT
Start: 2023-04-17 | End: 2023-04-17

## 2023-04-17 RX ORDER — APIXABAN 2.5 MG/1
1 TABLET, FILM COATED ORAL
Qty: 70 | Refills: 0
Start: 2023-04-17 | End: 2023-05-16

## 2023-04-17 RX ORDER — APIXABAN 2.5 MG/1
1 TABLET, FILM COATED ORAL
Qty: 74 | Refills: 0
Start: 2023-04-17 | End: 2023-05-16

## 2023-04-17 RX ORDER — APIXABAN 2.5 MG/1
10 TABLET, FILM COATED ORAL ONCE
Refills: 0 | Status: COMPLETED | OUTPATIENT
Start: 2023-04-17 | End: 2023-04-17

## 2023-04-17 RX ADMIN — APIXABAN 10 MILLIGRAM(S): 2.5 TABLET, FILM COATED ORAL at 19:23

## 2023-04-17 RX ADMIN — Medication 975 MILLIGRAM(S): at 19:23

## 2023-04-17 NOTE — ED CLERICAL - NS ED CLERK NOTE PRE-ARRIVAL INFORMATION; ADDITIONAL PRE-ARRIVAL INFORMATION
CC/Reason For referral: LEFT LEG CLOT  Preferred Consultant(if applicable):  Who admits for you (if needed):  Do you have documents you would like to fax over?  Would you still like to speak to an ED attending? N

## 2023-04-17 NOTE — ED PROVIDER NOTE - NSFOLLOWUPCLINICS_GEN_ALL_ED_FT
[facility] Vascular Surgery  Vascular Surgery  [address] [place] [address]  Phone: [phone]  Fax:

## 2023-04-17 NOTE — ED PROVIDER NOTE - NSFOLLOWUPINSTRUCTIONS_ED_ALL_ED_FT
You were sent a prescription for Apixaban which is a blood thinner. Please follow up with A Vascular Doctor as well as your Primary Care Doctor.    Deep Vein Thrombosis    A deep vein thrombosis (DVT) is a blood clot (thrombus) that usually occurs in a deep, larger vein of the lower leg or the pelvis, or in an upper extremity such as the arm. These are dangerous and can lead to serious and even life-threatening complications if the clot travels to the lungs. Symptoms include swelling of the arm or leg, warmth and redness of the arm or leg, and pain. Treatment may include taking a blood thinning medication; make sure to take anything prescribed as instructed.    SEEK IMMEDIATE MEDICAL CARE IF YOU HAVE ANY OF THE FOLLOWING SYMPTOMS: shortness of breath, chest pain, rapid or irregular heartbeat, lightheadedness/dizziness, coughing up blood, or any bleeding in your vomit, stool, or urine. These symptoms may represent a serious problem that is an emergency. Do not wait to see if the symptoms will go away. Call 911 and do not drive yourself to the hospital.

## 2023-04-17 NOTE — ED PROVIDER NOTE - ATTENDING CONTRIBUTION TO CARE
67-year-old with non-Hodgkin's lymphoma, squamous cell cancer of the nose, hypertension who flies regularly for work status post left foot pain and calf pain starting prior to most recent flight to [place].  Patient sought care in urgent care upon return noted to have a saphenous vein thrombosis that approximated the saphenofemoral junction.  Confirmed here by the [facility] duplex, comprehensive ultrasound which was independently reviewed by me and is approximating the saphenofemoral junction concerning for DVT equivalent.  Patient has swelling of the leg with intact pulses and sensory without any color change discrepancy from the contralateral leg.  Patient is scheduled for a Mohs surgery this Friday, his internal medicine doctor is out of town, patient educated on the risk and benefits of starting oral Eliquis therapy.  Patient and family educated that it may complicate the Mohs surgery planning.  Patient and family educated to keep leg elevated, take Tylenol for pain, take Eliquis as prescribed.  Patient educated on the bleeding risk of being on the medication Eliquis.  Educated to ensure that he did not take any high risk behaviors and to be careful for falls, head trauma etc.  Follow-up with primary care doctor, vascular surgery.  The patient was serially evaluated throughout emergency department course by the team. There was no acute deterioration up to this time in the emergency department. The patient has demonstrated clinical improvement and/or stability, feels better at this time according to emergency department team. Agree with goals/plan of emergency department care as described in this physician's electronic medical record, including diagnostics, therapeutics and consultation recommendation as clinically warranted. Will discharge home with close outpatient follow up with primary care physician/provider and specialist if necessary. The patient and/or family was educated on expectant management and return precautions concerning signs and features to return to the emergency department, in layman terms, including but not limited to: nausea, vomiting, fever, chills, the inability to eat, take medications, or drink, persistent/worsening symptoms or any concerns at all. There are no acute or immediate life threatening issues present on history, clinical exam, or any diagnostic evaluation. The patient is a safe disposition home, has capacity and insight into their condition, is ambulatory in the Emergency Department with no further questions and will follow up with their doctor(s) this week. Diagnosis, prognosis, natural history and treatment was discussed with patient and/or family. The patient and/or family were given the opportunity to ask questions and have them answered in full. The patient and/or family are with capacity and insight into the situation, treatment, risks, benefits, alternative therapies, and understand that they can ask any further questions if needed. Patient and/or family/guardian understand anticipatory guidance were given strict return and follow up precautions.  The patient and/or family/guardian have been informed of all concerning signs and symptoms to return to Emergency Department, the necessity to follow up with the PMD/Clinic/follow up provided within 2-3 days was explained, and the patient and/or family reports understanding of above with capacity and insight. The patient and/or family/guardian were informed of any results of their tests and are were encouraged to follow up on the findings with their doctor as well as the need to inform their doctor of any results. The patient and/or family/guardian are aware of the need to follow up with repeat testing as applicable and report understanding of the above with capacity and insight. The patient and/or family/guardian was made aware of any pending test results at the time of discharge and of the need to call back for the final results as well as the need to inform their doctor of the results.

## 2023-04-17 NOTE — ED PROVIDER NOTE - PHYSICAL EXAMINATION
GENERAL: Not in acute distress, non-toxic appearing  HEAD: normocephalic, atraumatic  HEENT: EOMI, normal conjunctiva, oral mucosa moist, neck supple  CARDIAC: regular rate and rhythm, normal S1 and S2,  no appreciable murmurs  PULM: clear to ascultation bilaterally, no crackles, rales, rhonchi, or wheezing  GI: abdomen nondistended, soft, nontender, no guarding or rebound tenderness  NEURO: alert and oriented x 3, normal speech, no focal motor or sensory deficits, gait normal, no gross neurologic deficit  MSK: + left medial calf tenderness/redness, + swelling to the left lower extremity up to the Mid calf  SKIN: No visible rashes, dry, well-perfused  PSYCH: appropriate mood and affect

## 2023-04-17 NOTE — ED PROVIDER NOTE - OBJECTIVE STATEMENT
67 year old male with hx of Non-Hodgkins lymphoma now in remission, HTN comes into the ED for eval of left leg swelling and pain for the past 7-10 days. He was in [place] last week after flying there. He was seen in an urgent care where he was sent for a DVT study which was positive for superficial vein thrombosis. He was advised to coem into the ED because the clot was more proximal than the staff at the urgent care were comfortable with. He denies any chest pain, SOB, dyspnea, hemoptosis, fevers, chills, abd pain, n/v or any other symptoms other than left leg pain, warmth, swelling, and mild erythema.

## 2023-04-17 NOTE — ED PROVIDER NOTE - CLINICAL SUMMARY MEDICAL DECISION MAKING FREE TEXT BOX
67 year old male with hx of Non-Hodgkins lymphoma now in remission, HTN comes into the ED for eval of left leg swelling and pain for the past 7-10 days. Pt likely has a DVT but does not have any signs or symptoms concerning for PE. Will order coags, CBC, CMP, DVT study. Pt can likely be treated with eliquis outpt as his Hestia Criteria score is 0. Will reach out to Pt's PCP to discuss treatment plan.

## 2023-04-17 NOTE — ED PROVIDER NOTE - PATIENT PORTAL LINK FT
You can access the FollowMyHealth Patient Portal offered by [facility] by registering at the following website: [email or web address]. By joining [employer]’s FollowMyHealth portal, you will also be able to view your health information using other applications (apps) compatible with our system.

## 2023-04-17 NOTE — ED ADULT NURSE NOTE - OBJECTIVE STATEMENT
patient is a 66 y/o M with hx of non-hodgkin's lymphoma (in remission since 2009), HTN who presents to the ED c/o DVT. patient states that he just came back from [place] a few days ago (via flight), and developed soreness and redness tracking up the left calf. patient seen at PCP and was told he had multiple superficial DVTs in the LLE. patient was directed to ED to be started on a/c. on exam patient LLE mildly swollen with redness along the medial aspect of left calf. able to bear weight and ambulate independently. MD Hearn at bedside. patient to go for US. updated on plan. comfort and safety maintained

## 2023-04-17 NOTE — ED ADULT TRIAGE NOTE - BP NONINVASIVE SYSTOLIC (MM HG)
Onset: tonight     Location/description: Last night pt had an elevated temp of 101, tylenol was given at 2100, pt spit it out. Temp is now 102, attempted to give tylenol again and pt keeps spitting it out. No other symptoms. Parents looking for other ways to administer medication.     Associated Symptoms: see above     What improves/worsens symptoms: NA     Symptom specific medications: tylenol     Intake and Output: decreased appetite; +fluid intake; +output     Activity level: wnl     Temperature (route and time): 0315 102 tympanic      Weight:   Wt Readings from Last 1 Encounters:   09/01/21 12.9 kg (96 %, Z= 1.73)*     * Growth percentiles are based on WHO (Boys, 0-2 years) data.        Recent visits (last 3-4 weeks) for same reason or recent surgery:      Did the patient have a positive coronavirus screening?: NA     PLAN:  Home Care Advice provided    Caller agrees to follow recommendations.    Reason for Disposition  • [1] Age UNDER 2 years AND [2] fever with no signs of serious infection AND [3] no localizing symptoms (all triage questions negative)  • [1] Non-prescription (OTC) liquid medicine AND [2] child refuses to take it    Protocols used: FEVER - 3 MONTHS OR OLDER-P-AH, MEDICATION - REFUSAL TO TAKE-P-AH      
Regarding: ADV-Temp 102.0  ----- Message from Kassandra Daigle sent at 9/15/2021  3:42 AM CDT -----  Patient Name: Charlie Simon    Full Name of Provider seen for current symptoms:Anyi Her    Pregnant (If Yes, how long?):N/A    Symptoms: Pt since last night with fever, now temp 102.0, given children's tylenol    Do you or any of your household members have the following symptoms:  Fever >100.0#F or >38.0#C: Yes    New or worsening cough, shortness of breath, sore throat, congestion, or runny nose: No    New onset of nausea, vomiting or diarrhea: No    New onset of loss of taste or smell, chills, repeated shaking with chills, muscle pain, or headache: No    Have you, a household member, or another person you have been in contact with tested positive for COVID-19 in the last 14 days?: No    Call Back #:[phone]    Call Center Account # for provider seen for current symptoms:[id number]    Which State are you currently located in? (enter State name in Summary field): [place]    
136

## 2023-04-17 NOTE — ED ADULT TRIAGE NOTE - CHIEF COMPLAINT QUOTE
abnormal US   pt with multiple clots to left lower extremity  recent travel [place]  denies sob/chest pain

## 2023-04-18 ENCOUNTER — INPATIENT (INPATIENT)
Facility: HOSPITAL | Age: 68
LOS: 0 days | Discharge: HOME | DRG: 176 | End: 2023-04-19
Attending: STUDENT IN AN ORGANIZED HEALTH CARE EDUCATION/TRAINING PROGRAM | Admitting: STUDENT IN AN ORGANIZED HEALTH CARE EDUCATION/TRAINING PROGRAM
Payer: COMMERCIAL

## 2023-04-18 VITALS
HEART RATE: 122 BPM | HEIGHT: 69 IN | OXYGEN SATURATION: 96 % | SYSTOLIC BLOOD PRESSURE: 170 MMHG | WEIGHT: 210.1 LBS | RESPIRATION RATE: 20 BRPM | DIASTOLIC BLOOD PRESSURE: 99 MMHG | TEMPERATURE: 100 F

## 2023-04-18 DIAGNOSIS — F41.9 ANXIETY DISORDER, UNSPECIFIED: ICD-10-CM

## 2023-04-18 DIAGNOSIS — I26.99 OTHER PULMONARY EMBOLISM WITHOUT ACUTE COR PULMONALE: ICD-10-CM

## 2023-04-18 DIAGNOSIS — L03.90 CELLULITIS, UNSPECIFIED: ICD-10-CM

## 2023-04-18 DIAGNOSIS — I10 ESSENTIAL (PRIMARY) HYPERTENSION: ICD-10-CM

## 2023-04-18 DIAGNOSIS — I82.409 ACUTE EMBOLISM AND THROMBOSIS OF UNSPECIFIED DEEP VEINS OF UNSPECIFIED LOWER EXTREMITY: ICD-10-CM

## 2023-04-18 DIAGNOSIS — Z29.9 ENCOUNTER FOR PROPHYLACTIC MEASURES, UNSPECIFIED: ICD-10-CM

## 2023-04-18 LAB
ALBUMIN SERPL ELPH-MCNC: 4.2 G/DL — SIGNIFICANT CHANGE UP (ref 3.3–5)
ALP SERPL-CCNC: 78 U/L — SIGNIFICANT CHANGE UP (ref 40–120)
ALT FLD-CCNC: 20 U/L — SIGNIFICANT CHANGE UP (ref 10–45)
ANION GAP SERPL CALC-SCNC: 13 MMOL/L — SIGNIFICANT CHANGE UP (ref 5–17)
APTT BLD: 30 SEC — SIGNIFICANT CHANGE UP (ref 27.5–35.5)
APTT BLD: 74.4 SEC — HIGH (ref 27.5–35.5)
AST SERPL-CCNC: 17 U/L — SIGNIFICANT CHANGE UP (ref 10–40)
BASE EXCESS BLDV CALC-SCNC: 2.1 MMOL/L — SIGNIFICANT CHANGE UP (ref -2–3)
BASOPHILS # BLD AUTO: 0.03 K/UL — SIGNIFICANT CHANGE UP (ref 0–0.2)
BASOPHILS NFR BLD AUTO: 0.2 % — SIGNIFICANT CHANGE UP (ref 0–2)
BILIRUB SERPL-MCNC: 0.4 MG/DL — SIGNIFICANT CHANGE UP (ref 0.2–1.2)
BUN SERPL-MCNC: 14 MG/DL — SIGNIFICANT CHANGE UP (ref 7–23)
CA-I SERPL-SCNC: 1.38 MMOL/L — HIGH (ref 1.15–1.33)
CALCIUM SERPL-MCNC: 10.7 MG/DL — HIGH (ref 8.4–10.5)
CHLORIDE BLDV-SCNC: 99 MMOL/L — SIGNIFICANT CHANGE UP (ref 96–108)
CHLORIDE SERPL-SCNC: 100 MMOL/L — SIGNIFICANT CHANGE UP (ref 96–108)
CO2 BLDV-SCNC: 31 MMOL/L — HIGH (ref 22–26)
CO2 SERPL-SCNC: 25 MMOL/L — SIGNIFICANT CHANGE UP (ref 22–31)
CREAT SERPL-MCNC: 1.02 MG/DL — SIGNIFICANT CHANGE UP (ref 0.5–1.3)
EGFR: 81 ML/MIN/1.73M2 — SIGNIFICANT CHANGE UP
EOSINOPHIL # BLD AUTO: 0.15 K/UL — SIGNIFICANT CHANGE UP (ref 0–0.5)
EOSINOPHIL NFR BLD AUTO: 1.1 % — SIGNIFICANT CHANGE UP (ref 0–6)
GAS PNL BLDV: 134 MMOL/L — LOW (ref 136–145)
GAS PNL BLDV: SIGNIFICANT CHANGE UP
GAS PNL BLDV: SIGNIFICANT CHANGE UP
GLUCOSE BLDV-MCNC: 120 MG/DL — HIGH (ref 70–99)
GLUCOSE SERPL-MCNC: 119 MG/DL — HIGH (ref 70–99)
HCO3 BLDV-SCNC: 29 MMOL/L — SIGNIFICANT CHANGE UP (ref 22–29)
HCT VFR BLD CALC: 41.1 % — SIGNIFICANT CHANGE UP (ref 39–50)
HCT VFR BLD CALC: 43.7 % — SIGNIFICANT CHANGE UP (ref 39–50)
HCT VFR BLDA CALC: 43 % — SIGNIFICANT CHANGE UP (ref 39–51)
HGB BLD CALC-MCNC: 14.4 G/DL — SIGNIFICANT CHANGE UP (ref 12.6–17.4)
HGB BLD-MCNC: 13.3 G/DL — SIGNIFICANT CHANGE UP (ref 13–17)
HGB BLD-MCNC: 14.1 G/DL — SIGNIFICANT CHANGE UP (ref 13–17)
HOROWITZ INDEX BLDV+IHG-RTO: SIGNIFICANT CHANGE UP
IMM GRANULOCYTES NFR BLD AUTO: 0.6 % — SIGNIFICANT CHANGE UP (ref 0–0.9)
INR BLD: 1.59 RATIO — HIGH (ref 0.88–1.16)
LACTATE BLDV-MCNC: 1.6 MMOL/L — SIGNIFICANT CHANGE UP (ref 0.5–2)
LYMPHOCYTES # BLD AUTO: 1.5 K/UL — SIGNIFICANT CHANGE UP (ref 1–3.3)
LYMPHOCYTES # BLD AUTO: 11.4 % — LOW (ref 13–44)
MCHC RBC-ENTMCNC: 27.5 PG — SIGNIFICANT CHANGE UP (ref 27–34)
MCHC RBC-ENTMCNC: 27.5 PG — SIGNIFICANT CHANGE UP (ref 27–34)
MCHC RBC-ENTMCNC: 32.3 GM/DL — SIGNIFICANT CHANGE UP (ref 32–36)
MCHC RBC-ENTMCNC: 32.4 GM/DL — SIGNIFICANT CHANGE UP (ref 32–36)
MCV RBC AUTO: 84.9 FL — SIGNIFICANT CHANGE UP (ref 80–100)
MCV RBC AUTO: 85.4 FL — SIGNIFICANT CHANGE UP (ref 80–100)
MONOCYTES # BLD AUTO: 1.4 K/UL — HIGH (ref 0–0.9)
MONOCYTES NFR BLD AUTO: 10.6 % — SIGNIFICANT CHANGE UP (ref 2–14)
NEUTROPHILS # BLD AUTO: 10.05 K/UL — HIGH (ref 1.8–7.4)
NEUTROPHILS NFR BLD AUTO: 76.1 % — SIGNIFICANT CHANGE UP (ref 43–77)
NRBC # BLD: 0 /100 WBCS — SIGNIFICANT CHANGE UP (ref 0–0)
NRBC # BLD: 0 /100 WBCS — SIGNIFICANT CHANGE UP (ref 0–0)
NT-PROBNP SERPL-SCNC: 112 PG/ML — SIGNIFICANT CHANGE UP (ref 0–300)
PCO2 BLDV: 54 MMHG — SIGNIFICANT CHANGE UP (ref 42–55)
PH BLDV: 7.34 — SIGNIFICANT CHANGE UP (ref 7.32–7.43)
PLATELET # BLD AUTO: 288 K/UL — SIGNIFICANT CHANGE UP (ref 150–400)
PLATELET # BLD AUTO: 308 K/UL — SIGNIFICANT CHANGE UP (ref 150–400)
PO2 BLDV: 27 MMHG — SIGNIFICANT CHANGE UP (ref 25–45)
POTASSIUM BLDV-SCNC: 3.8 MMOL/L — SIGNIFICANT CHANGE UP (ref 3.5–5.1)
POTASSIUM SERPL-MCNC: 4 MMOL/L — SIGNIFICANT CHANGE UP (ref 3.5–5.3)
POTASSIUM SERPL-SCNC: 4 MMOL/L — SIGNIFICANT CHANGE UP (ref 3.5–5.3)
PROT SERPL-MCNC: 7 G/DL — SIGNIFICANT CHANGE UP (ref 6–8.3)
PROTHROM AB SERPL-ACNC: 18.5 SEC — HIGH (ref 10.5–13.4)
RBC # BLD: 4.84 M/UL — SIGNIFICANT CHANGE UP (ref 4.2–5.8)
RBC # BLD: 5.12 M/UL — SIGNIFICANT CHANGE UP (ref 4.2–5.8)
RBC # FLD: 14.5 % — SIGNIFICANT CHANGE UP (ref 10.3–14.5)
RBC # FLD: 14.6 % — HIGH (ref 10.3–14.5)
SAO2 % BLDV: 35.3 % — LOW (ref 67–88)
SODIUM SERPL-SCNC: 138 MMOL/L — SIGNIFICANT CHANGE UP (ref 135–145)
TROPONIN T, HIGH SENSITIVITY RESULT: 11 NG/L — SIGNIFICANT CHANGE UP (ref 0–51)
WBC # BLD: 11.78 K/UL — HIGH (ref 3.8–10.5)
WBC # BLD: 13.21 K/UL — HIGH (ref 3.8–10.5)
WBC # FLD AUTO: 11.78 K/UL — HIGH (ref 3.8–10.5)
WBC # FLD AUTO: 13.21 K/UL — HIGH (ref 3.8–10.5)

## 2023-04-18 PROCEDURE — 99223 1ST HOSP IP/OBS HIGH 75: CPT

## 2023-04-18 PROCEDURE — 74177 CT ABD & PELVIS W/CONTRAST: CPT | Mod: 26

## 2023-04-18 PROCEDURE — 99222 1ST HOSP IP/OBS MODERATE 55: CPT

## 2023-04-18 PROCEDURE — 93306 TTE W/DOPPLER COMPLETE: CPT | Mod: 26

## 2023-04-18 PROCEDURE — 99285 EMERGENCY DEPT VISIT HI MDM: CPT

## 2023-04-18 PROCEDURE — 71275 CT ANGIOGRAPHY CHEST: CPT | Mod: 26,MA

## 2023-04-18 RX ORDER — HEPARIN SODIUM 5000 [USP'U]/ML
7500 INJECTION INTRAVENOUS; SUBCUTANEOUS ONCE
Refills: 0 | Status: COMPLETED | OUTPATIENT
Start: 2023-04-18 | End: 2023-04-18

## 2023-04-18 RX ORDER — MORPHINE SULFATE 50 MG/1
4 CAPSULE, EXTENDED RELEASE ORAL ONCE
Refills: 0 | Status: DISCONTINUED | OUTPATIENT
Start: 2023-04-18 | End: 2023-04-18

## 2023-04-18 RX ORDER — FUROSEMIDE 40 MG
1 TABLET ORAL
Refills: 0 | DISCHARGE

## 2023-04-18 RX ORDER — METOPROLOL TARTRATE 50 MG
25 TABLET ORAL DAILY
Refills: 0 | Status: DISCONTINUED | OUTPATIENT
Start: 2023-04-18 | End: 2023-04-19

## 2023-04-18 RX ORDER — ACETAMINOPHEN 500 MG
1000 TABLET ORAL ONCE
Refills: 0 | Status: COMPLETED | OUTPATIENT
Start: 2023-04-18 | End: 2023-04-18

## 2023-04-18 RX ORDER — APIXABAN 2.5 MG/1
10 TABLET, FILM COATED ORAL EVERY 12 HOURS
Refills: 0 | Status: DISCONTINUED | OUTPATIENT
Start: 2023-04-18 | End: 2023-04-19

## 2023-04-18 RX ORDER — LOSARTAN POTASSIUM 100 MG/1
100 TABLET, FILM COATED ORAL DAILY
Refills: 0 | Status: DISCONTINUED | OUTPATIENT
Start: 2023-04-18 | End: 2023-04-19

## 2023-04-18 RX ORDER — HEPARIN SODIUM 5000 [USP'U]/ML
3500 INJECTION INTRAVENOUS; SUBCUTANEOUS EVERY 6 HOURS
Refills: 0 | Status: DISCONTINUED | OUTPATIENT
Start: 2023-04-18 | End: 2023-04-18

## 2023-04-18 RX ORDER — HEPARIN SODIUM 5000 [USP'U]/ML
INJECTION INTRAVENOUS; SUBCUTANEOUS
Qty: 25000 | Refills: 0 | Status: DISCONTINUED | OUTPATIENT
Start: 2023-04-18 | End: 2023-04-18

## 2023-04-18 RX ORDER — HEPARIN SODIUM 5000 [USP'U]/ML
7500 INJECTION INTRAVENOUS; SUBCUTANEOUS EVERY 6 HOURS
Refills: 0 | Status: DISCONTINUED | OUTPATIENT
Start: 2023-04-18 | End: 2023-04-18

## 2023-04-18 RX ORDER — LOSARTAN POTASSIUM 100 MG/1
1 TABLET, FILM COATED ORAL
Refills: 0 | DISCHARGE

## 2023-04-18 RX ADMIN — MORPHINE SULFATE 4 MILLIGRAM(S): 50 CAPSULE, EXTENDED RELEASE ORAL at 06:16

## 2023-04-18 RX ADMIN — Medication 400 MILLIGRAM(S): at 23:19

## 2023-04-18 RX ADMIN — APIXABAN 10 MILLIGRAM(S): 2.5 TABLET, FILM COATED ORAL at 20:10

## 2023-04-18 RX ADMIN — HEPARIN SODIUM 1700 UNIT(S)/HR: 5000 INJECTION INTRAVENOUS; SUBCUTANEOUS at 19:18

## 2023-04-18 RX ADMIN — Medication 100 MILLIGRAM(S): at 20:10

## 2023-04-18 RX ADMIN — HEPARIN SODIUM 7500 UNIT(S): 5000 INJECTION INTRAVENOUS; SUBCUTANEOUS at 07:54

## 2023-04-18 RX ADMIN — MORPHINE SULFATE 4 MILLIGRAM(S): 50 CAPSULE, EXTENDED RELEASE ORAL at 07:59

## 2023-04-18 RX ADMIN — HEPARIN SODIUM 1700 UNIT(S)/HR: 5000 INJECTION INTRAVENOUS; SUBCUTANEOUS at 07:49

## 2023-04-18 NOTE — H&P ADULT - PROBLEM SELECTOR PLAN 4
c/w losartan 100   c/w metoprol succinate 50 qD  holding lasix 20 qD given patient looks more dry and will get contrast 4/19. can restart as an outpatient

## 2023-04-18 NOTE — CONSULT NOTE ADULT - ATTENDING COMMENTS
Heparin today, then at 6PM switch to eliquis 10mg BID  Pulmonary consult for lung findings on CT    tomorrow, if creatinine stable, then CT Abd/Pelvis with IV contrast    Yaquelin

## 2023-04-18 NOTE — ED PROVIDER NOTE - ATTENDING CONTRIBUTION TO CARE
I, Miguel Kumari, performed a history and physical exam of the patient and discussed their management with the resident and/or advanced care provider. I reviewed the resident and/or advanced care provider's note and agree with the documented findings and plan of care except where noted. I was present and available for all procedures.    see mdm

## 2023-04-18 NOTE — ED ADULT NURSE REASSESSMENT NOTE - NS ED NURSE REASSESS COMMENT FT1
report given to Fernandez MONTANO, all questions and concerns addressed. pt stable for transfer.
1611 per hospitalist to stop heparin at 1800/please re confirm

## 2023-04-18 NOTE — H&P ADULT - ASSESSMENT
Patient is a 67yom w/ hx of HTN, Follicular Lymphoma (last chemo 2009), who presents to the ED for pleuritic chest pain, found to have b/l PE.

## 2023-04-18 NOTE — ED PROVIDER NOTE - PHYSICAL EXAMINATION
PHYSICAL EXAM:  GENERAL: non-toxic appearing; in no respiratory distress  HEAD Atraumatic, Normocephalic  NECK: No JVD; trachea midline  EYES: PERRL, EOMs intact b/l w/out deficits; normal conjunctiva  CHEST/LUNG: CTAB no wheezes/rhonchi/rales  HEART: tachycardic; regular rhythm; no murmur/gallops/rubs  ABDOMEN: soft, NT, ND  EXTREMITIES: LLE swelling and ttp; +2 radial pulses b/l, +2 DP/PT pulses b/l  MUSCULOSKELETAL: FROM of all 4 extremities  NERVOUS SYSTEM:  A&Ox3, No motor deficits or sensory deficits; CNII-XII intact; Speech is fluent and appropriate  SKIN:  Warm and dry as visualized

## 2023-04-18 NOTE — ED ADULT NURSE NOTE - OBJECTIVE STATEMENT
66 y/o male arriving via triage c/o SOB and awoke from sleep with dyspnea. Patient was recently in [facility] ED yesterday and dx with DVT in left lower leg. Was DCed with Eliquis, states he took the fist dose in the ED and has yet to take the second. States he has excruciating right back pain. PMHx non-hodgkin's lymphoma, HTN on losartan and took his dose today. Upon assessment patient a&ox4, MAEx4, breathing tachypneic on RA. Denies chest pain, fever, chills, n/v. 18G L AC inserted and labs sent. Patient placed on cardiac monitor. VSS. Awaiting CT scan. Wife at bedside. 68 y/o male arriving via triage c/o SOB and awoke from sleep with dyspnea. Patient was recently in [facility] ED yesterday and dx with DVT in left lower leg. Was DCed with Eliquis, states he took the fist dose in the ED and has yet to take the second. States he has excruciating right back pain. States recent travel to [place] on airline. PMHx non-hodgkin's lymphoma, HTN on losartan and took his dose today. Upon assessment patient a&ox4, MAEx4, breathing tachypneic on RA. Denies chest pain, fever, chills, n/v. 18G L AC inserted and labs sent. Patient placed on cardiac monitor. VSS. Awaiting CT scan. Wife at bedside.

## 2023-04-18 NOTE — H&P ADULT - NSHPLABSRESULTS_GEN_ALL_CORE
EKG personally reviewed: sinus tachycardia. no st elevation or depression. no pvcs.     CT scan:  < from: CT Angio Chest PE Protocol w/ IV Cont (04.18.23 @ 06:36) >    IMPRESSION:  Bilateral segmental and subsegmental pulmonary emboli as above. No   evidence of right heart strain. Presumed bibasilar atelectasis and less   likely pulmonary infarct.  Trace right pleural effusion.    < end of copied text >    TTE:  < from: TTE W or WO Ultrasound Enhancing Agent (04.18.23 @ 09:08) >     1. The left ventricular systolic function is normal with an ejection fraction of 60 % by Arechiga's method of disks.   2. Right ventricle not visualized on axis; grossly, upper limit of normal right ventricular size with normal right ventricular systolic function. Basal RV diameter~4.1 cm.   3. Trace mitral regurgitation.   4. Pulmonary artery systolic pressure could not be estimated.   5. Focused TTE performed to evaluate biventricular systolic function.   6. No prior echocardiogram is available for comparison.   7. Technically difficult image quality.    < end of copied text >          Labs below are personally reviewed:

## 2023-04-18 NOTE — H&P ADULT - PROBLEM SELECTOR PLAN 3
diagnosed with right elbow cellulitis, has mild erythema  - on doxy 100bid from urgent care - okay to continue for 2 more days

## 2023-04-18 NOTE — ED PROVIDER NOTE - PROGRESS NOTE DETAILS
Roshan PGY2: recommendations from the PERT team: to start Eliquis 10 mg BID starting at 6 pm today, CT abdomen and pelvis tmr. Per PERT team, pulmonary consultation is placed, agreeable to evaluate the patient. Will admit to the hospitalist.

## 2023-04-18 NOTE — H&P ADULT - HISTORY OF PRESENT ILLNESS
Patient is a 67yom w/ hx of HTN, Follicular Lympphoma (last chemo 2009), who presents to the ED for pleuritic chest pain. Patient has been having 12 days of lower extremity pain, worse on the left side, worse on the calf area. Traveled to [place] via airplane, but has not had any long drives. denies any recent fevers, lower extremity redness/erythema, cough. He came to the ED 4/17, LE dopplers show L great saphenous vein DVT, dc'd on eliquis. Last night, developed right sided chest pain and right flank pain and this morning, pain was severe enough to limit his breathing, and prevents him from taking deep breaths. He also endorses some palpitations and these constellation of symptoms brought him into the ED.    In the ED, he was found to have b/l Bilateral segmental and subsegmental pulmonary emboli, placed on heparin gtt and admitted for further workup. Weaned off oxygen, saturating 100% on RA.    Of note, patient has bee battling with a few illnesses lately - had bronchitis in March, thrombosed hemorrhoids in April, cellulitis in his elbow on friday, currently on doxycycline. Had a "gluteal lymph node biopsy" with [facility] on Feb 2023.

## 2023-04-18 NOTE — PATIENT PROFILE ADULT - FALL HARM RISK - UNIVERSAL INTERVENTIONS
Bed in lowest position, wheels locked, appropriate side rails in place/Call bell, personal items and telephone in reach/Instruct patient to call for assistance before getting out of bed or chair/Non-slip footwear when patient is out of bed/[place]itely to call system/Physically safe environment - no spills, clutter or unnecessary equipment/Purposeful Proactive Rounding/Room/bathroom lighting operational, light cord in reach

## 2023-04-18 NOTE — CONSULT NOTE ADULT - ASSESSMENT
67M with H/O lymphoma (in remission) now with B/L PE  - Hemodynamically stable no evidence of right heart strain on echo. While PESI score is high, Harman is low, making patient low-intermediate risk at worst.  - Currently on heparin gtt, plan to transition to Eliquis  - Pleuritic chest pain: if persistent can consider NSAIDs ± steroids, likely consequence of pulmonary infarct causing pleurisy  - Continue to monitor in hospital  - Please feel free to call with any questions

## 2023-04-18 NOTE — H&P ADULT - PROBLEM SELECTOR PLAN 1
CT showing b/l PE, and recently diagnosed with LLE DVT ( which was likely present as he has had lower extremity pain for ~12 days)  - vasc cards recs appreciated: c/w heparin gtt until tonight, plan to switch to eliquis 10mg BID tonight   - unclear if this is provoked or not, will obtain CT A/P w/ IV contrast given hx of malignancy, plan for 4/19  - TTE ok, no s/s of RHS  - vitals stable, on 100RA right now, bp stable

## 2023-04-18 NOTE — CONSULT NOTE ADULT - SUBJECTIVE AND OBJECTIVE BOX
HPI:     yo M pmhx non-hodgkins lymphoma, HTN, presents to the ED c/o acute onset SOB, worse w/ exertion, and pleuritic R sided CP. Pt was seen here yesterday for 7 days of LLE swelling and pain, had an US that showed a L great saphenous vein extending down to the calf, was dc'd on eliquis (took one dose).    He has pleuritic R sided chest pain     Duplex here w superficial vein thrombosis and CTA w bilateral PE.     Recent travel to [place] 2-4 hrs on plane     No family hx of thrombosis     Never smoker        Allergies    No Known Allergies    Intolerances    	    MEDICATIONS:  heparin   Injectable 7500 Unit(s) IV Push every 6 hours PRN  heparin   Injectable 3500 Unit(s) IV Push every 6 hours PRN  heparin  Infusion.  Unit(s)/Hr IV Continuous <Continuous>           PAST MEDICAL & SURGICAL HISTORY:      FAMILY HISTORY:      SOCIAL HISTORY:  unchanged    REVIEW OF SYSTEMS:  12 point ROS completed, negative unless stated in HPI  [ x] All others negative	  [ ] Unable to obtain    PHYSICAL EXAM:  T(C): 37.6 (04-18-23 @ 04:50), Max: 37.6 (04-18-23 @ 04:50)  HR: 90 (04-18-23 @ 12:24) (90 - 122)  BP: 148/99 (04-18-23 @ 12:24) (136/95 - 170/99)  RR: 20 (04-18-23 @ 12:24) (15 - 20)  SpO2: 100% (04-18-23 @ 12:24) (96% - 100%)  Wt(kg): --  I&O's Summary      Appearance:  	  HEENT:   Normal oral mucosa, PERRL, EOMI	  Carotid: NO Bruit   Cardiovascular:  RRR  Respiratory:  	Decreased lung sound right lung bases  Psychiatry:  AAO x 3  Gastrointestinal:  Soft, Non-tender, + BS	  Skin: No rashes, No ecchymoses, No cyanosis	  Neurologic:  non focal   Extremities:  spontaneous   Vascular Pulse Exam: LE pulses palpable     LABS:	 	    CBC Full  -  ( 18 Apr 2023 05:56 )  WBC Count : 13.21 K/uL  Hemoglobin : 14.1 g/dL  Hematocrit : 43.7 %  Platelet Count - Automated : 308 K/uL  Mean Cell Volume : 85.4 fl  Mean Cell Hemoglobin : 27.5 pg  Mean Cell Hemoglobin Concentration : 32.3 gm/dL  Auto Neutrophil # : 10.05 K/uL  Auto Lymphocyte # : 1.50 K/uL  Auto Monocyte # : 1.40 K/uL  Auto Eosinophil # : 0.15 K/uL  Auto Basophil # : 0.03 K/uL  Auto Neutrophil % : 76.1 %  Auto Lymphocyte % : 11.4 %  Auto Monocyte % : 10.6 %  Auto Eosinophil % : 1.1 %  Auto Basophil % : 0.2 %    04-18    138  |  100  |  14  ----------------------------<  119<H>  4.0   |  25  |  1.02  04-17    139  |  102  |  13  ----------------------------<  109<H>  4.2   |  26  |  1.01    Ca    10.7<H>      18 Apr 2023 05:56  Ca    10.5      17 Apr 2023 17:29    TPro  7.0  /  Alb  4.2  /  TBili  0.4  /  DBili  x   /  AST  17  /  ALT  20  /  AlkPhos  78  04-18  TPro  6.7  /  Alb  4.1  /  TBili  0.5  /  DBili  x   /  AST  11  /  ALT  19  /  AlkPhos  73  04-17      
# PULMONARY CONSULTATION NOTE  [facility] DIVISION OF PULMONARY, CRITICAL CARE, AND SLEEP MEDICINE  Office: [phone]    **Patient Name**: JOSEPH NAQVI  MRN-[id number]    CHIEF COMPLAINT: Patient is a 67y old  Male who presents with a chief complaint of PE (18 Apr 2023 16:17)      HISTORY OF PRESENT ILLNESS:   Patient is a 67yom w/ hx of HTN, Follicular Lympphoma (last chemo 2009), who presents to the ED for pleuritic chest pain. Patient has been having 12 days of lower extremity pain, worse on the left side, worse on the calf area. Traveled to [place] via airplane, but has not had any long drives. denies any recent fevers, lower extremity redness/erythema, cough. He came to the ED 4/17, LE dopplers show L great saphenous vein DVT, dc'd on eliquis. Last night, developed right sided chest pain and right flank pain and this morning, pain was severe enough to limit his breathing, and prevents him from taking deep breaths. He also endorses some palpitations and these constellation of symptoms brought him into the ED.    In the ED, he was found to have b/l Bilateral segmental and subsegmental pulmonary emboli, placed on heparin gtt and admitted for further workup. Weaned off oxygen, saturating 100% on RA.    Of note, patient has been battling with a few illnesses lately - had bronchitis in March, thrombosed hemorrhoids in April, cellulitis in his elbow on friday, currently on doxycycline. Had a "gluteal lymph node biopsy" with [facility] on Feb 2023.  (18 Apr 2023 16:17)      # Histories:  ## Family:  FAMILY HISTORY:  No pertinent family history in first degree relatives        ## PMH/PSH:  PAST MEDICAL & SURGICAL HISTORY:  Hypertension      Follicular lymphoma      No significant past surgical history          Allergies No Known Allergies    ## Social History:  - Marital Status:   - Exposure / Travel: recent trip to [place]  - Code status: Full    # ROS  CONSTITUTIONAL: no fever / chills / rigors  CARDIOVASCULAR: no palpitations  PULMONARY: +pleuritic chest pain denies dyspnea at present  GASTROINTESTINAL: no N/V/D  [ x ] ALL OTHER REVIEW OF SYSTEMS ARE NEGATIVE     ## O/E:  ICU Vital Signs Last 24 Hrs  T(C): 36.9 (18 Apr 2023 20:56), Max: 37.6 (18 Apr 2023 04:50)  HR: 105 (18 Apr 2023 20:56) (90 - 122)  BP: 165/109 (18 Apr 2023 20:56) (144/94 - 170/99)  BP(mean): 125 (18 Apr 2023 07:39) (125 - 125)  RR: 18 (18 Apr 2023 20:56) (18 - 20)  SpO2: 94% (18 Apr 2023 20:56) (94% - 100%)    O2 Parameters below as of 18 Apr 2023 20:56 Patient On (Oxygen Delivery Method): room air    Gen: lying comfortably in bed in no apparent distress on room air at present  HEENT: PERRL, EOMI  Resp: CTA B/L no c/r/w  CVS: S1S2 no m/r/g  Abd: soft NT/ND +BS  Ext: no c/c/e  Neuro: A&Ox3    # Medications  MEDICATIONS  (STANDING):  apixaban 10 milliGRAM(s) Oral every 12 hours  doxycycline monohydrate Capsule 100 milliGRAM(s) Oral every 12 hours  losartan 100 milliGRAM(s) Oral daily  metoprolol succinate ER 25 milliGRAM(s) Oral daily  PARoxetine 20 milliGRAM(s) Oral daily    MEDICATIONS  (PRN):      ```  ## Labs:  ** CBC: **                        13.3   11.78 )-----------( 288      ( 18 Apr 2023 13:47 )             41.1     ** Chem:  **  04-18    138  |  100  |  14  ----------------------------<  119<H>  4.0   |  25  |  1.02    Ca    10.7<H>      18 Apr 2023 05:56    TPro  7.0  /  Alb  4.2  /  TBili  0.4  /  DBili  x   /  AST  17  /  ALT  20  /  AlkPhos  78  04-18    ** Coags: **  PT/INR - ( 18 Apr 2023 05:56 )   PT: 18.5 sec;   INR: 1.59 ratio    PTT - ( 18 Apr 2023 13:47 )  PTT:74.4 sec  ```    # Radiology / Imaging  ## CT Chest:  < from: CT Angio Chest PE Protocol w/ IV Cont (04.18.23 @ 06:36) >  VESSELS: Adequate opacification of the pulmonary arteries, however, motion artifact is present which degrades image quality and limits the evaluation of subsegmental vessels. Filling defects are seen within distal segmental and proximal subsegmental posterior right lowerlobe pulmonary arteries, proximal anterior segmental right upper lobe pulmonary artery and segmental left lateral lower lobe pulmonary artery. The main pulmonary artery is normal in caliber. The thoracic aorta is normal in caliber without evidence of dissection.  HEART: Heart size is normal. No pericardial effusion. No CT evidence of right heart strain.  < end of copied text >

## 2023-04-18 NOTE — CONSULT NOTE ADULT - ASSESSMENT
Assessment:  1. Bilateral PE. Low risk (normal biventricular function, negative trop and BNP), Consider unprovoked PE given hx of malignancy   2. Superificial DVT   3. Hx Non-hodgkin's lymphoma   4. Melanoma            Plan:  1. Cont heparin gtt until this evening then dose Eliquis 10 mg BID X 7 days, then transition to 5 mg BID there after   2. Plan for CT abd/pelvis for further evaluation of possible malignancy   3. Recc pulm consult for right lower lung consolidation   4. Will observe overnight, if condition improves/remains stable consider discharge tmrw with outpatient follow up w Vasc Luis Alberto in 2 weeks         Thank you      Vascular Cardiology Service    Please call with any questions:   DIRECT SERVICE NUMBER:  [phone]  Office [phone]  email:  [email or web address]     Assessment:  1. Bilateral PE. Low risk (normal biventricular function, negative trop and BNP), Consider unprovoked PE given hx of malignancy   2. Superificial DVT   3. Hx Non-hodgkin's lymphoma   4. Melanoma            Plan:  1. Cont heparin gtt until this evening then dose Eliquis 10 mg BID X 7 days, then transition to 5 mg BID there after   2. Plan for CT abd/pelvis with IV Contrast tomorrow  for further evaluation of possible malignancy   [facility]   4. Will observe overnight, if condition improves/remains stable consider discharge tmrw with outpatient follow up w Vasc Luis Alberto in 2 weeks         Thank you      Vascular Cardiology Service    Please call with any questions:   DIRECT SERVICE NUMBER:  [phone]  Office [phone]  email:  [email or web address]

## 2023-04-18 NOTE — ED PROVIDER NOTE - OBJECTIVE STATEMENT
-- DO NOT REPLY / DO NOT REPLY ALL --  -- Message is from the [facility]--    Provider paged via [employer] Documentation - The below message was copied and pasted from a [employer] page:  [phone] PATIENT NUMBER -------------------------------- ACC NURSE LINE (IF QUESTIONS ONLY - [phone]) FROM: KELLY STILL REQUESTED DR:DESMOND CLAY PATIENT OXANA CONTRERAS DECLINED ER DISPOSITION FOR SEVERE LEFT FLANK PAIN-DX. IN ER TUESDAY WITH KIDNEY STONE-WANTS TO SPEAK WITH PCP BEFORE GOING BACK TO ER TO REQUESTS DIFFERENT PAIN MEDICINE. PT. REFERRED TO A SPECIALIST BUT NO APPOINTMENT YET. ALSO, VOMITED X 1 TODAY. PLEASE CALL PATIENT [phone]. THANKS, PASP ACC RN      Life threat cleared, Seen in ER Tuesday for kidney stones-Given pain medicine and had CT scan, Given referral to a specialist. [facility]  Onset:   Location / description: Left side and into back-flank area  Precipitating Factors: Kidney stone  Pain Scale (1-10), 10 highest: 9/10  Associated Symptoms: Vomited 130am x 1, feels has to urinate but then not much comes out  What improves / worsens symptoms: Nothing  Symptom specific medications: Hydrocodone, Naparoxyn-not helpful  Recent visits (last 3-4 weeks) for same reason or recent surgery: N/A    PLAN:   Paged Provider  Patient declined upgraded emergency room disposition and requests to speak with PCP about ordering a different pain medicine. On call Dr. Corby cantu paged to patient.   Patient/Caller agrees to follow recommendations.    Reason for Disposition  • Side (flank) or lower back pain present    Protocols used: URINARY SYMPTOMS-A-AH       68 yo M pmhx non-hodgkins lymphoma, HTN, presents to the ED c/o acute onset SOB, worse w/ exertion, and pleuritic R sided CP. Pt was seen here yesterday for 7 days of LLE swelling and pain, had an US that showed a L great saphenous vein extending down to the calf, was dc'd on eliquis (took one dose). denies cough, abd pain, n/v/d, palpitations, dizziness, fevers.

## 2023-04-18 NOTE — ED PROVIDER NOTE - CLINICAL SUMMARY MEDICAL DECISION MAKING FREE TEXT BOX
Miguel Kumari MD. 66 yo M pmhx non-hodgkins lymphoma, HTN, presents to the ED c/o acute onset SOB, worse w/ exertion, and pleuritic R sided CP. Pt was seen here yesterday for 7 days of LLE swelling and pain, had an US that showed a L great saphenous vein extending down to the calf, was dc'd on eliquis (took one dose).   pt tachycardic, 90% on RA, improved on 2L NC, tachypneic, normal BP. awake, alert. neurologically intact, no focal deficits. A&ox3. rrr nl s1/s2, no m/r/g. lungs cta. abd soft nt nd. the LLE is swollen, ttp. no rash.  pt was started on eliquis for recnt dvt dx yday (took 1 dose) but now coming in with pleuritic r cp and sob and hypoxia concerning for Pulmonary embolism. Will check CBC to eval WBC, anemia, plt count; CMP to eval for lyte abnormalities, renal and/or liver dysfunction. trop and pro bnp to eval for rh strain. pocus showing preserved systolic ef, does not appear to have rh strain. will obtain CTA pe, will need admission. low suspicion for acs or dissection. no abd ttp to indicate biliary dz. will reassess.

## 2023-04-18 NOTE — H&P ADULT - NSHPREVIEWOFSYSTEMS_GEN_ALL_CORE
CONSTITUTIONAL/GENERAL: No weakness, fevers or chills  EYES/OPHTHALMOLOGIC: No visual changes, No blurry vision, No vertigo   ENMT: No throat pain, or neck stiffness   RESPIRATORY/THORAX: No cough, wheezing, hemoptysis; has shortness of breath, cannot take deep breaths  CARDIOVASCULAR: chest pain as described in hpi. no palpitations currently   GASTROINTESTINAL: No abdominal or epigastric pain. No nausea, vomiting, or hematemesis; No diarrhea or constipation. No melena or hematochezia.  GENITOURINARY: No dysuria, frequency or hematuria  NEUROLOGICAL: No numbness or weakness  SKIN: No itching, rashes  ENDOCRINOLOGY: no heat intolerance, no cold intolerance, no weight gain, no weight loss  PSYCHIATRIC:  no si/no hi, mood stable, no anxiety  MUSCULOSKELETAL SYSTEM:  no joint pain, no myalgias, no arthralgias, no joint swelling

## 2023-04-18 NOTE — H&P ADULT - NSHPSOCIALHISTORY_GEN_ALL_CORE
Marital status:   Living situation: lives with wife  Occupation: works as a vet  Tobacco Use: denies any tobacco use  Alcohol Use: denies etoh intake  Drug use: denies cocaine, heroine and other illicit drug use

## 2023-04-18 NOTE — ED ADULT TRIAGE NOTE - CHIEF COMPLAINT QUOTE
seen here yesterday and dx with blood clot in leg; started on anticoagulation pill, took one dose so far; woke up tonight with dyspnea

## 2023-04-19 ENCOUNTER — TRANSCRIPTION ENCOUNTER (OUTPATIENT)
Age: 68
End: 2023-04-19

## 2023-04-19 VITALS
HEART RATE: 98 BPM | DIASTOLIC BLOOD PRESSURE: 93 MMHG | OXYGEN SATURATION: 93 % | TEMPERATURE: 100 F | SYSTOLIC BLOOD PRESSURE: 150 MMHG | RESPIRATION RATE: 18 BRPM

## 2023-04-19 LAB
HCV AB S/CO SERPL IA: 0.06 S/CO — SIGNIFICANT CHANGE UP (ref 0–0.99)
HCV AB SERPL-IMP: SIGNIFICANT CHANGE UP

## 2023-04-19 PROCEDURE — C8929: CPT

## 2023-04-19 PROCEDURE — 85730 THROMBOPLASTIN TIME PARTIAL: CPT

## 2023-04-19 PROCEDURE — 83605 ASSAY OF LACTIC ACID: CPT

## 2023-04-19 PROCEDURE — 85025 COMPLETE CBC W/AUTO DIFF WBC: CPT

## 2023-04-19 PROCEDURE — 84295 ASSAY OF SERUM SODIUM: CPT

## 2023-04-19 PROCEDURE — 85014 HEMATOCRIT: CPT

## 2023-04-19 PROCEDURE — 82947 ASSAY GLUCOSE BLOOD QUANT: CPT

## 2023-04-19 PROCEDURE — 99285 EMERGENCY DEPT VISIT HI MDM: CPT | Mod: 25

## 2023-04-19 PROCEDURE — 86803 HEPATITIS C AB TEST: CPT

## 2023-04-19 PROCEDURE — 82330 ASSAY OF CALCIUM: CPT

## 2023-04-19 PROCEDURE — 82435 ASSAY OF BLOOD CHLORIDE: CPT

## 2023-04-19 PROCEDURE — 71275 CT ANGIOGRAPHY CHEST: CPT | Mod: MA

## 2023-04-19 PROCEDURE — 85018 HEMOGLOBIN: CPT

## 2023-04-19 PROCEDURE — 99239 HOSP IP/OBS DSCHRG MGMT >30: CPT

## 2023-04-19 PROCEDURE — 85027 COMPLETE CBC AUTOMATED: CPT

## 2023-04-19 PROCEDURE — 74177 CT ABD & PELVIS W/CONTRAST: CPT

## 2023-04-19 PROCEDURE — 84132 ASSAY OF SERUM POTASSIUM: CPT

## 2023-04-19 PROCEDURE — 82803 BLOOD GASES ANY COMBINATION: CPT

## 2023-04-19 PROCEDURE — 93308 TTE F-UP OR LMTD: CPT

## 2023-04-19 PROCEDURE — 83880 ASSAY OF NATRIURETIC PEPTIDE: CPT

## 2023-04-19 PROCEDURE — 80053 COMPREHEN METABOLIC PANEL: CPT

## 2023-04-19 PROCEDURE — 84484 ASSAY OF TROPONIN QUANT: CPT

## 2023-04-19 PROCEDURE — 85610 PROTHROMBIN TIME: CPT

## 2023-04-19 PROCEDURE — 99233 SBSQ HOSP IP/OBS HIGH 50: CPT

## 2023-04-19 RX ORDER — CHLORHEXIDINE GLUCONATE 213 G/1000ML
1 SOLUTION TOPICAL
Refills: 0 | Status: DISCONTINUED | OUTPATIENT
Start: 2023-04-19 | End: 2023-04-19

## 2023-04-19 RX ORDER — METOPROLOL TARTRATE 50 MG
1 TABLET ORAL
Qty: 30 | Refills: 0
Start: 2023-04-19 | End: 2023-05-18

## 2023-04-19 RX ORDER — METOPROLOL TARTRATE 50 MG
1 TABLET ORAL
Refills: 0 | DISCHARGE

## 2023-04-19 RX ORDER — METOPROLOL TARTRATE 50 MG
25 TABLET ORAL ONCE
Refills: 0 | Status: COMPLETED | OUTPATIENT
Start: 2023-04-19 | End: 2023-04-19

## 2023-04-19 RX ORDER — METOPROLOL TARTRATE 50 MG
50 TABLET ORAL DAILY
Refills: 0 | Status: DISCONTINUED | OUTPATIENT
Start: 2023-04-20 | End: 2023-04-19

## 2023-04-19 RX ADMIN — Medication 1000 MILLIGRAM(S): at 00:30

## 2023-04-19 RX ADMIN — Medication 25 MILLIGRAM(S): at 06:55

## 2023-04-19 RX ADMIN — Medication 25 MILLIGRAM(S): at 12:47

## 2023-04-19 RX ADMIN — APIXABAN 10 MILLIGRAM(S): 2.5 TABLET, FILM COATED ORAL at 08:22

## 2023-04-19 RX ADMIN — LOSARTAN POTASSIUM 100 MILLIGRAM(S): 100 TABLET, FILM COATED ORAL at 06:55

## 2023-04-19 RX ADMIN — Medication 20 MILLIGRAM(S): at 11:10

## 2023-04-19 RX ADMIN — Medication 100 MILLIGRAM(S): at 08:22

## 2023-04-19 NOTE — DISCHARGE NOTE PROVIDER - HOSPITAL COURSE
Patient is a 67yom w/ hx of HTN, Follicular Lymphoma (last chemo 2009), who presents to the ED for pleuritic chest pain, found to have b/l PE.   #Pulmonary embolism.    CT showing b/l PE, and recently diagnosed with LLE DVT ( which was likely present as he has had lower extremity pain for ~12 days)  - vasc cards recs appreciated: c/w heparin gtt until tonight, plan to switch to eliquis 10mg BID tonight   - unclear if this is provoked or not, will obtain CT A/P w/ IV contrast given hx of malignancy, plan for 4/19  - TTE ok, no s/s of RHS  - vitals stable, on 100RA right now, bp stable.  # DVT, lower extremity.   heparin gtt to eliquis transition as above.  # Cellulitis.    diagnosed with right elbow cellulitis, has mild erythema  - on doxy 100bid from urgent care - okay to continue for 2 more days.  # Hypertension.   c/w losartan 100   c/w metoprol succinate 50 qD  holding lasix 20 qD given patient looks more dry and will get contrast 4/19. can restart as an outpatient.#Anxiety.   ·  Plan: c/w paroxetine 20 qD.  dispo: pending CT 4/19.     Patient is a 67yom w/ hx of HTN, Follicular Lymphoma (last chemo 2009), who presents to the ED for pleuritic chest pain, found to have b/l PE.   #Pulmonary embolism.    CT showing b/l PE, and recently diagnosed with LLE DVT ( which was likely present as he has had lower extremity pain for ~12 days)  - vasc cards recs appreciated: c/w heparin gtt until tonight, plan to switch to eliquis 10mg BID tonight   - unclear if this is provoked or not, will obtain CT A/P w/ IV contrast given hx of malignancy, plan for 4/19  - TTE ok, no s/s of RHS  - vitals stable, on 100RA right now, bp stable.    Leg doppler -   There is an occlusive superficial venous thrombus in the great saphenous   vein extending from the thigh to the calf.    IMPRESSION:  No deep venous thrombosis.  Occlusive superficial venous thrombosis in the left great saphenous vein   extending from the thigh to the calf.    # Cellulitis.    diagnosed with right elbow cellulitis, has mild erythema  - on doxy 100bid from urgent care - okay to continue for 2 more days.  # Hypertension.   c/w losartan 100   c/w metoprol succinate 50 qD  holding lasix 20 qD given patient looks more dry and will get contrast 4/19. can restart as an outpatient.#Anxiety.   ·  Plan: c/w paroxetine 20 qD.  dispo: pending CT 4/19.     Patient is a 67yom w/ hx of HTN, Follicular Lymphoma (last chemo 2009), who presents to the ED for pleuritic chest pain, found to have b/l PE.   #Pulmonary embolism.    CT showing b/l PE, and recently diagnosed with LLE DVT ( which was likely present as he has had lower extremity pain for ~12 days)  - vasc cards recs appreciated: c/w heparin gtt until tonight, plan to switch to eliquis 10mg BID tonight   - unclear if this is provoked or not, will obtain CT A/P w/ IV contrast given hx of malignancy, plan for 4/19  - TTE ok, no s/s of RHS  - vitals stable, on 100RA right now, bp stable.    Leg doppler -   There is an occlusive superficial venous thrombus in the great saphenous   vein extending from the thigh to the calf.    IMPRESSION:  No deep venous thrombosis.  Occlusive superficial venous thrombosis in the left great saphenous vein   extending from the thigh to the calf.    # Cellulitis.    diagnosed with right elbow cellulitis, has mild erythema  - on doxy 100bid from urgent care - okay to continue for 2 more days.  # Hypertension.   Continue with Losartan and Metoprolol  .#Anxiety.   ·  Plan: c/w paroxetine 20 qD.    Elevated BPs and tachycardia discussed with Dr. Correa  metoprolol increased to 50 mg daily   CT of a/p results reviewed- < from: CT Abdomen and Pelvis w/ IV Cont (04.18.23 @ 21:45) >    Thin-walled cystic lesion in the left retroperitoneum measuring up to 5.4   cm. Findings may reflect sequela of remote trauma. Other etiologies such   as lymphangioma or cystic change related to neurogenic tumor also   considerations.    pt to follow up with hem/Onc as outpatient

## 2023-04-19 NOTE — DISCHARGE NOTE NURSING/CASE MANAGEMENT/SOCIAL WORK - NSDCPEELIQUISDIET_GEN_ALL_CORE
Date of Service: 04/19/2023    PREOPERATIVE DIAGNOSIS:   Degenerative joint disease, right knee.    POSTOPERATIVE DIAGNOSIS:   Degenerative joint disease, right knee.    PROCEDURE:   Right total knee arthroplasty, Pl[place]ia cemented cruciate retaining Triathlon components.    SURGEON:   Tank Freitas III, MD.    ASSISTANT:   Gracie Manzano PA-C necessary for positioning, retraction and closure.  Ale Pagan SA.    ANESTHESIA:   General with adductor canal block and local.    ESTIMATED BLOOD LOSS:   Less than 100 mL.    DRAINS:   None.    FLUIDS:   Crystalloid.    SPECIMENS SENT:   None.    BEARING:   Metal on polyethylene.    FIXATION:   Cemented.    IMPLANTS:   Placentia Triathlon primary tibial baseplate, size 4; Triathlon X3 symmetric patellar component, size 31 mm x 9 mm; Triathlon cruciate retaining femoral component, size 5 right; Triathlon X3 tibial bearing insert, CS size 4 x 9 mm; Simplex P bone cement; antibiotic Simplex P bone cement.    CLINICAL NOTE:   The patient is a 71-year-old female with advanced degeneration of the right knee, predominantly medial compartment, the patellofemoral compartment also involved.  She did not respond long-term to nonsurgical means and was brought to the operating room today for the above procedure after discussion of risks, benefits and alternatives in the office.  She was seen in holding where adductor block was administered by anesthesia, site marked, history and physical updated, consent obtained.    OPERATIVE PROCEDURE:   The patient was administered general anesthesia and intravenous antibiotics, as well as intravenous tranexamic acid.  Right lower extremity was prepped with Hibiclens and ChloraPrep from the upper thigh tourniquet to the tips of toes, then draped free in the usual fashion.  Skin was excluded from the field with adherent plastic iodine drapes.  A timeout was taken.  Tourniquet was inflated to 250 mmHg after the right lower extremity had  been exsanguinated by elevation and an elastic wrap.  Longitudinal incision was made from 2 fingerbreadths above the patella to the tibial tubercle, midline.  Quad tendon was split and the deep incision carried into a medial parapatellar arthrotomy.  Hemostasis was accomplished with electrocautery.  Anterior distal femur and proximal medial tibia were exposed subperiosteally.  Lateral plica was released and fat pad partially resected.  The knee was flexed 90 degrees or more and the patella everted.  Off the jig, the femur was cut size 5, 8-9 mm from the joint line, 3 degrees of external rotation, 6 degrees of valgus.  Trial fit well.  Lug holes were made.  The tibia was then cut off the jig with posterior slope matching her own and in neutral varus-valgus, preserving the PCL and intraarticular popliteus. Menisci and ACL were resected.  Fin slots were cut in the tibia and  medial osteophytes were removed.  No significant posterior osteophytes were noted with the knee at 90 degrees and the laminar  in place.  Local tissues posteriorly were infiltrated with a proprietary local anesthetic mixture.  The patella was then cut from 22 to 14 mm and drilled for a 31 mm x 9 symmetric patellar component with medialization.  A batch of antibiotic and batch of regular Simplex P bone cement were then mixed.  The cut bone surfaces were copiously irrigated with the pulsatile lavage device and dried.  Sequentially the tibial component, the femoral component, and the patellar component were cemented into place.  Excess cement was removed and cement allowed to harden.  After ascertaining there was no further cement retained posteriorly nor elsewhere, the permanent 9 mm CS tray was snap fit into place.  There was excellent balance, motion and tracking.  No lateral release was necessary.  After further irrigation, the arthrotomy was closed from the tibial tubercle to the superior pole of the patella with a running #1 PDS, quad  tendon with same, subcutaneous with 2-0 Vicryl and 3-0 Vicryl inverted, skin with running subcuticular 3-0 Stratafix.  Sterile dressings were then applied, followed by an elastic wrap.  Tourniquet was then let down, total inflation per the medical record.  The patient was then awakened and transferred to the recovery area in good condition having tolerated the procedure well.  Needle and sponge counts were reported as correct.      Dictated By: Tank Freitas III, MD  Signing Provider: Tank Freitas III, MD AJB/kandice ([id number])   DD: 04/19/2023 2:00:19 PM TD: 04/19/2023 2:40:28 PM      Copy Sent To:  Chiquita Louise MD   Eat healthy foods you enjoy. Apixaban/Eliquis DOES NOT have a special diet. Limit your alcohol intake.

## 2023-04-19 NOTE — DISCHARGE NOTE PROVIDER - ATTENDING DISCHARGE PHYSICAL EXAMINATION:
PHYSICAL EXAM  GENERAL: NAD, lying comfortably in bed   HEAD:  Atraumatic, Normocephalic  EYES: EOMI b/l, conjunctiva and sclera clear  NECK: Supple, No LAD   CHEST/LUNG: crackles at right lower lobe; No wheeze or ronchi  HEART: Regular rate and rhythm; S1 and S2 present, No murmurs, rubs, or gallops  ABDOMEN: Soft, Nontender, Nondistended; Bowel sounds present  EXTREMITIES:  2+ Peripheral Pulses, No edema  NEURO: AAOx3, non-focal   SKIN: No rashes or lesions    Telemetry: NSR. Reached out to Heme Onc Dr. Van Berrios and left message per family request. Records from [facility] showed that patient had left RP lymphocele on 01/2023. Outpatient heme and vascular cardiology follow up.

## 2023-04-19 NOTE — DISCHARGE NOTE PROVIDER - NSDCCPCAREPLAN_GEN_ALL_CORE_FT
PRINCIPAL DISCHARGE DIAGNOSIS  Diagnosis: Pulmonary embolism  Assessment and Plan of Treatment: CT showing b/l PE, and recently diagnosed with LLE DVT ( which was likely present as he has had lower extremity pain for ~12 days)  - vasc cards recs appreciated: c/w heparin gtt until tonight, plan to switch to eliquis 10mg BID tonight   - unclear if this is provoked or not, will obtain CT A/P w/ IV contrast given hx of malignancy, plan for 4/19  - TTE ok, no s/s of RHS  - vitals stable, on 100RA right now, bp stable.      SECONDARY DISCHARGE DIAGNOSES  Diagnosis: DVT, lower extremity  Assessment and Plan of Treatment:     Diagnosis: Cellulitis  Assessment and Plan of Treatment:     Diagnosis: Hypertension  Assessment and Plan of Treatment:     Diagnosis: Anxiety  Assessment and Plan of Treatment:     Diagnosis: Abnormal abdominal CT scan  Assessment and Plan of Treatment: CT of abdomen and Pelvis showed Thin-walled cystic lesion in the left retroperitoneum measuring up to 5.4 cm. Findings may reflect sequela of remote trauma. Other etiologies such as lymphangioma or cystic change related to neurogenic tumor also considerations.  Please follow up with your hem/onc doctor        PRINCIPAL DISCHARGE DIAGNOSIS  Diagnosis: Pulmonary embolism  Assessment and Plan of Treatment: CT showing b/l PE, and recently diagnosed with LLE DVT ( which was likely present as he has had lower extremity pain for ~12 days)  - vasc cards recs appreciated: c/w heparin gtt until tonight, plan to switch to eliquis 10mg BID tonight   - unclear if this is provoked or not, will obtain CT A/P w/ IV contrast given hx of malignancy, plan for 4/19  - TTE ok, no s/s of RHS  - vitals stable, on 100RA right now, bp stable.      SECONDARY DISCHARGE DIAGNOSES  Diagnosis: Cellulitis  Assessment and Plan of Treatment:     Diagnosis: Hypertension  Assessment and Plan of Treatment:     Diagnosis: Anxiety  Assessment and Plan of Treatment:     Diagnosis: Abnormal abdominal CT scan  Assessment and Plan of Treatment: CT of abdomen and Pelvis showed Thin-walled cystic lesion in the left retroperitoneum measuring up to 5.4 cm. Findings may reflect sequela of remote trauma. Other etiologies such as lymphangioma or cystic change related to neurogenic tumor also considerations.  Please follow up with your hem/onc doctor       Diagnosis: Acute superficial venous thrombosis of lower extremity  Assessment and Plan of Treatment: leg doppler showed - No deep venous thrombosis.  Occlusive superficial venous thrombosis in the left great saphenous vein   extending from the thigh to the calf.  You are on Eliquis for PE. continue with eliquis     PRINCIPAL DISCHARGE DIAGNOSIS  Diagnosis: Pulmonary embolism  Assessment and Plan of Treatment: CT showing b/l PE, and recently diagnosed with LLE DVT ( which was likely present as he has had lower extremity pain for ~12 days)  You were evaluate by vascular cardiology  Now on Eliquis . please continue with Eliquis 10 mg q 12 hours for 7 days until 4/25 am dose. then continue with 5 mg q 12 hours   Please follow up with vascular cardiology in  2weeks   Please follow up wtih your primray care physician in one week      SECONDARY DISCHARGE DIAGNOSES  Diagnosis: Cellulitis  Assessment and Plan of Treatment: Please complete the doxycycline as directed until tomorrow 4/20    Diagnosis: Hypertension  Assessment and Plan of Treatment: Continue with Losartan and increased dose of Metoprolol    Diagnosis: Anxiety  Assessment and Plan of Treatment: Continue with paroxetine 20 daily    Diagnosis: Abnormal abdominal CT scan  Assessment and Plan of Treatment: CT of abdomen and Pelvis showed Thin-walled cystic lesion in the left retroperitoneum measuring up to 5.4 cm. Findings may reflect sequela of remote trauma. Other etiologies such as lymphangioma or cystic change related to neurogenic tumor also considerations.  Please follow up with your hem/onc doctor       Diagnosis: Acute superficial venous thrombosis of lower extremity  Assessment and Plan of Treatment: leg doppler showed - No deep venous thrombosis.  Occlusive superficial venous thrombosis in the left great saphenous vein   extending from the thigh to the calf.  You are on Eliquis for PE. continue with eliquis

## 2023-04-19 NOTE — DISCHARGE NOTE PROVIDER - NSDCMRMEDTOKEN_GEN_ALL_CORE_FT
Eliquis Starter Pack for Treatment of DVT and PE 5 mg oral tablet: 1 packet(s) orally 2 times a day 10 mg by mouth twice a day for 7 days. Then 5 mg by mouth twice a day until you follow up with vascular doctor/Primary Care doctor MDD: 4  Lasix 20 mg oral tablet: 1 orally once a day  losartan 100 mg oral tablet: 1 orally once a day  metoprolol succinate 25 mg oral capsule, extended release: 1 orally once a day  Paxil 20 mg oral tablet: 1 orally once a day (at bedtime)   Eliquis Starter Pack for Treatment of DVT and PE 5 mg oral tablet: 1 packet(s) orally 2 times a day 10 mg by mouth twice a day for 7 days. Then 5 mg by mouth twice a day until you follow up with vascular doctor/Primary Care doctor MDD: 4  Lasix 20 mg oral tablet: 1 orally once a day  losartan 100 mg oral tablet: 1 orally once a day  Paxil 20 mg oral tablet: 1 orally once a day (at bedtime)   doxycycline monohydrate 50 mg oral capsule: 2 cap(s) orally every 12 hours till tomorrow 4/20  Eliquis Starter Pack for Treatment of DVT and PE 5 mg oral tablet: 1 packet(s) orally 2 times a day 10 mg by mouth twice a day for 7 days. Then 5 mg by mouth twice a day until you follow up with vascular doctor/Primary Care doctor MDD: 4  Lasix 20 mg oral tablet: 1 orally once a day  losartan 100 mg oral tablet: 1 orally once a day  metoprolol succinate 50 mg oral tablet, extended release: 1 tab(s) orally once a day  Paxil 20 mg oral tablet: 1 orally once a day (at bedtime)

## 2023-04-19 NOTE — DISCHARGE NOTE PROVIDER - CARE PROVIDER_API CALL
Mateus Jamison (DO)  Cardiovascular Disease; Internal Medicine; Nuclear Cardiology  [address] [place] [address]  Phone: [phone]  Fax: [phone]  Follow Up Time: 2 weeks    Brenna Gunn)  Internal Medicine; Nephrology  [address], [address]  [place] [address]  Phone: [phone]  Fax: [phone]  Follow Up Time: 1 week

## 2023-04-19 NOTE — PROGRESS NOTE ADULT - SUBJECTIVE AND OBJECTIVE BOX
INTERVAL HISTORY: Transitioned to Eliquis. Off supplemental O2. R pleuritic pain somewhat improved         Allergies  No Known Allergies  	    MEDICATIONS:  apixaban 10 milliGRAM(s) Oral every 12 hours  losartan 100 milliGRAM(s) Oral daily  metoprolol succinate ER 25 milliGRAM(s) Oral daily  doxycycline monohydrate Capsule 100 milliGRAM(s) Oral every 12 hours  PARoxetine 20 milliGRAM(s) Oral daily        PAST MEDICAL & SURGICAL HISTORY:  Hypertension  Follicular lymphoma  No significant past surgical history    FAMILY HISTORY:  No pertinent family history in first degree relatives      SOCIAL HISTORY:  unchanged    REVIEW OF SYSTEMS:  See Interval events   [ x] All others negative	  [ ] Unable to obtain    PHYSICAL EXAM:  T(C): 37.1 (04-19-23 @ 08:15), Max: 37.1 (04-19-23 @ 08:15)  HR: 105 (04-19-23 @ 09:38) (90 - 110)  BP: 168/104 (04-19-23 @ 09:38) (148/99 - 171/116)  RR: 18 (04-19-23 @ 08:15) (18 - 20)  SpO2: 96% (04-19-23 @ 08:15) (94% - 100%)      Appearance:  	  HEENT:   Normal oral mucosa, PERRL, EOMI	  Carotid: NO Bruit   Cardiovascular:  RRR  Respiratory:  	Decreased lung sound right lung bases  Psychiatry:  AAO x 3  Gastrointestinal:  Soft, Non-tender, + BS	  Skin: No rashes, No ecchymoses, No cyanosis	  Neurologic:  non focal   Extremities:  spontaneous   Vascular Pulse Exam: LE pulses palpable     LABS:	 	    CBC Full  -  ( 18 Apr 2023 13:47 )  WBC Count : 11.78 K/uL  Hemoglobin : 13.3 g/dL  Hematocrit : 41.1 %  Platelet Count - Automated : 288 K/uL  Mean Cell Volume : 84.9 fl  Mean Cell Hemoglobin : 27.5 pg  Mean Cell Hemoglobin Concentration : 32.4 gm/dL  Auto Neutrophil # : x  Auto Lymphocyte # : x  Auto Monocyte # : x  Auto Eosinophil # : x  Auto Basophil # : x  Auto Neutrophil % : x  Auto Lymphocyte % : x  Auto Monocyte % : x  Auto Eosinophil % : x  Auto Basophil % : x    04-18    138  |  100  |  14  ----------------------------<  119<H>  4.0   |  25  |  1.02  04-17    139  |  102  |  13  ----------------------------<  109<H>  4.2   |  26  |  1.01    Ca    10.7<H>      18 Apr 2023 05:56  Ca    10.5      17 Apr 2023 17:29    TPro  7.0  /  Alb  4.2  /  TBili  0.4  /  DBili  x   /  AST  17  /  ALT  20  /  AlkPhos  78  04-18  TPro  6.7  /  Alb  4.1  /  TBili  0.5  /  DBili  x   /  AST  11  /  ALT  19  /  AlkPhos  73  04-17          Assessment:  1.            Plan:  1.          Thank you      Vascular Cardiology Service   [facility] - [id number]  Office [phone]  email:   [email or web address]

## 2023-04-19 NOTE — DISCHARGE NOTE PROVIDER - PROVIDER TOKENS
PROVIDER:[TOKEN:[[id number]],FOLLOWUP:[2 weeks]],PROVIDER:[TOKEN:[[id number]],FOLLOWUP:[1 week]]

## 2023-04-19 NOTE — PROGRESS NOTE ADULT - ATTENDING COMMENTS
Continue eliquis for now  CT abd findings - defer to primary team - perhaps IR needs to eval vs. followup with his oncologist for this  Pulm eval apprecaited    Yaquelin

## 2023-04-19 NOTE — DISCHARGE NOTE NURSING/CASE MANAGEMENT/SOCIAL WORK - NSDCPEFALRISK_GEN_ALL_CORE
For information on Fall & Injury Prevention, visit: [email or web address] OR  [email or web address] OR  https://www.cdc.gov/steadi/patient.html

## 2023-04-19 NOTE — DISCHARGE NOTE PROVIDER - NSDCFUADDAPPT_GEN_ALL_CORE_FT
APPTS ARE READY TO BE MADE: [X ] YES    Best Family or Patient Contact (if needed):    Additional Information about above appointments (if needed):    1: Dr. Jamison in 2 weeks   2: pCP in one week   3: heme/onc doctor in one to two weeks     Other comments or requests:    APPTS ARE READY TO BE MADE: [X ] YES    Best Family or Patient Contact (if needed):    Additional Information about above appointments (if needed):    1: Dr. Jamison in 2 weeks   2: pCP in one week   3: heme/onc doctor in one to two weeks       Patient was provided with follow up request details and was advised to call to schedule follow up within specified time frame.   Other comments or requests:

## 2023-04-19 NOTE — PHARMACOTHERAPY INTERVENTION NOTE - COMMENTS
66 YO M, to be discharged on Eliquis starter pack for PE/ DVTs.    Counseled patient and wife at bedside on Eliquis (brand/generic), indication, directions for use (10mg (2 tabs) twice daily for 7 days and then 5mg (1 tab) twice daily) and possible side effects (bleeding). Patient was provided with a medication card for their new medication. Patient questions and concerns were answered and addressed. Patient demonstrated understanding. Patient understood importance of compliance and to follow up with cardiologist once discharged.    Eliquis RX sent to [facility]; Pt has commercial insurance. Copay is $241 but spoke to pharmacy and pt's wife and she already picked up medication. High price likely due to unmet deductible, but patient has to call insurance [facility] [employer] to find out.     Ashly Grande, PharmD, [facility]  Clinical Pharmacy Specialist  [phone] or Teams

## 2023-04-19 NOTE — PROGRESS NOTE ADULT - ASSESSMENT
Assessment:  1. Bilateral PE. Low risk (normal biventricular function, negative trop and BNP), Consider unprovoked PE given hx of malignancy   2. Superificial DVT   3. Hx Non-hodgkin's lymphoma   4. Melanoma            Plan:  1. Cont Eliquis 10 mg BID X 7 days, then transition to 5 mg BID there after   2.  CT abd/pelvis w thin walled cyst- should be followed up as outpatient by pts Heme/Onc team   3. Appreciate pulm evaluation   4. Symptoms stable/improving, tolerating Eliquis stable consider discharge today with outpatient follow up w Vasc Cards in 2 weeks         Thank you      Vascular Cardiology Service    Please call with any questions:   DIRECT SERVICE NUMBER:  [phone]  Office [phone]  email:  [email or web address]

## 2023-04-19 NOTE — DISCHARGE NOTE NURSING/CASE MANAGEMENT/SOCIAL WORK - NSDCFUADDAPPT_GEN_ALL_CORE_FT
APPTS ARE READY TO BE MADE: [X ] YES    Best Family or Patient Contact (if needed):    Additional Information about above appointments (if needed):    1: Dr. Jamison in 2 weeks   2: pCP in one week   3: heme/onc doctor in one to two weeks     Other comments or requests:

## 2023-04-19 NOTE — DISCHARGE NOTE NURSING/CASE MANAGEMENT/SOCIAL WORK - PATIENT PORTAL LINK FT
You can access the FollowMyHealth Patient Portal offered by [facility] by registering at the following website: [email or web address]. By joining [employer]’s FollowMyHealth portal, you will also be able to view your health information using other applications (apps) compatible with our system.

## 2023-04-20 ENCOUNTER — NON-APPOINTMENT (OUTPATIENT)
Age: 68
End: 2023-04-20

## 2023-04-20 PROBLEM — I10 ESSENTIAL (PRIMARY) HYPERTENSION: Chronic | Status: ACTIVE | Noted: 2023-04-18

## 2023-04-20 PROBLEM — C82.90 FOLLICULAR LYMPHOMA, UNSPECIFIED, UNSPECIFIED SITE: Chronic | Status: ACTIVE | Noted: 2023-04-18

## 2023-04-20 PROCEDURE — 93308 TTE F-UP OR LMTD: CPT | Mod: 26

## 2023-04-27 ENCOUNTER — NON-APPOINTMENT (OUTPATIENT)
Age: 68
End: 2023-04-27

## 2023-04-27 ENCOUNTER — APPOINTMENT (OUTPATIENT)
Dept: CARDIOLOGY | Facility: CLINIC | Age: 68
End: 2023-04-27
Payer: COMMERCIAL

## 2023-04-27 VITALS
BODY MASS INDEX: 31.4 KG/M2 | SYSTOLIC BLOOD PRESSURE: 160 MMHG | HEART RATE: 97 BPM | HEIGHT: 69 IN | WEIGHT: 212 LBS | OXYGEN SATURATION: 97 % | DIASTOLIC BLOOD PRESSURE: 98 MMHG

## 2023-04-27 PROCEDURE — 99214 OFFICE O/P EST MOD 30 MIN: CPT | Mod: 25

## 2023-04-27 PROCEDURE — 93000 ELECTROCARDIOGRAM COMPLETE: CPT

## 2023-04-27 NOTE — ASSESSMENT
[FreeTextEntry1] : \par Assessment and Recommendation:\par - Assessment	\par Assessment:\par 1. Bilateral PE. Low risk (normal biventricular function, negative trop and\par BNP), Consider unprovoked PE given hx of malignancy\par 2. Superificial DVT\par 3. Hx Non-hodgkin's lymphoma\par 4. Melanoma\par  \par \par \par Plan:\par 1. Cont Eliquis  5 mg BID  \par 2.  CT abd/pelvis w thin walled cyst- should be followed up as outpatient by\par pts Heme/Onc team\par 3.  Repeat venous duplex in 2 months\par 4. RTC 3 months \par 5.  Labs today \par 6.  Home BP monitoring\par 7.  He should wait at least a month before traveling by plane\par 8.  Increase activity as tolerated.  \par 9.  He should delay his Moh's surgery until our next visit.  \par 10.  Hold off on dental cleanings for now.

## 2023-04-27 NOTE — REASON FOR VISIT
[FreeTextEntry1] : 4/22/2023\par Now on eliquis 5mg BID\par Feels better. \par No bleeding issues\par Left leg is ok, swelling is better\par Breathing is back to normal\par No plueritic chest pain \par No labs since discharge. \par Dr. Gunn is PCP\par He does not have a BP cuff\par  \par MEDICATIONS:\par apixaban 10 milliGRAM(s) Oral every 12 hours\par losartan 100 milliGRAM(s) Oral daily\par metoprolol succinate ER 25 milliGRAM(s) Oral daily\par PARoxetine 20 milliGRAM(s) Oral daily\par lasix 20mg daily \par  \par  \par From hospital stay:\par yo M pmhx non-hodgkins lymphoma, HTN, presents to the ED c/o acute onset SOB,\par worse w/ exertion, and pleuritic R sided CP. Pt was seen here yesterday for 7\par days of LLE swelling and pain, had an US that showed a L great saphenous vein\par extending down to the calf, was dc'd on eliquis (took one dose).\par  \par He has pleuritic R sided chest pain\par  \par Duplex here w superficial vein thrombosis and CTA w bilateral PE.\par  \par Recent travel to [place] 2-4 hrs on plane\par  \par No family hx of thrombosis\par  \par Never smoker\par \par CONCLUSIONS:\par  \par  1. The left ventricular systolic function is normal with an ejection fraction of 60 % by Arechiga's method of disks.\par  2. Right ventricle not visualized on axis; grossly, upper limit of normal right ventricular size with normal right ventricular systolic function. Basal RV diameter~4.1 cm.\par  3. Trace mitral regurgitation.\par  4. Pulmonary artery systolic pressure could not be estimated.\par  5. Focused TTE performed to evaluate biventricular systolic function.\par  6. No prior echocardiogram is available for comparison.\par  7. Technically difficult image quality.\par \par \par IMPRESSION:\par Bilateral segmental and subsegmental pulmonary emboli as above. No evidence of right heart strain. Presumed bibasilar atelectasis and less likely pulmonary infarct.\par Trace right pleural effusion.\par \par Findings were discussed by Dr. Weeks with MD Joanie on 4/18/2023 7:12 AM with read back confirmation.\par \par --- End of Report ---\par \par IMPRESSION:\par No deep venous thrombosis.\par Occlusive superficial venous thrombosis in the left great saphenous vein extending from the thigh to the calf.\par \par --- End of Report ---\par

## 2023-04-27 NOTE — PHYSICAL EXAM
[Normal Jugular Venous A Waves Present] : normal jugular venous A waves present [Normal Jugular Venous V Waves Present] : normal jugular venous V waves present [No Jugular Venous Merritt A Waves] : no jugular venous merritt A waves [Respiration, Rhythm And Depth] : normal respiratory rhythm and effort [Exaggerated Use Of Accessory Muscles For Inspiration] : no accessory muscle use [Auscultation Breath Sounds / Voice Sounds] : lungs were clear to auscultation bilaterally [Heart Rate And Rhythm] : heart rate and rhythm were normal [Heart Sounds] : normal S1 and S2 [Murmurs] : no murmurs present [Abdomen Soft] : soft [Abdomen Tenderness] : non-tender [Abdomen Mass (___ Cm)] : no abdominal mass palpated [Abnormal Walk] : normal gait [Gait - Sufficient For Exercise Testing] : the gait was sufficient for exercise testing [General Appearance - Well Developed] : well developed [Normal Appearance] : normal appearance [Well Groomed] : well groomed [General Appearance - Well Nourished] : well nourished [No Deformities] : no deformities [General Appearance - In No Acute Distress] : no acute distress [Nail Clubbing] : no clubbing of the fingernails [Petechial Hemorrhages (___cm)] : no petechial hemorrhages [Cyanosis, Localized] : no localized cyanosis [] : no ischemic changes

## 2023-04-28 LAB
ALBUMIN SERPL ELPH-MCNC: 4.4 G/DL
ALP BLD-CCNC: 84 U/L
ALT SERPL-CCNC: 30 U/L
ANION GAP SERPL CALC-SCNC: 16 MMOL/L
AST SERPL-CCNC: 20 U/L
BASOPHILS # BLD AUTO: 0.05 K/UL
BASOPHILS NFR BLD AUTO: 0.5 %
BILIRUB SERPL-MCNC: <0.2 MG/DL
BUN SERPL-MCNC: 14 MG/DL
CALCIUM SERPL-MCNC: 11 MG/DL
CHLORIDE SERPL-SCNC: 102 MMOL/L
CO2 SERPL-SCNC: 23 MMOL/L
CREAT SERPL-MCNC: 1.08 MG/DL
DEPRECATED D DIMER PPP IA-ACNC: 349 NG/ML DDU
EGFR: 76 ML/MIN/1.73M2
EOSINOPHIL # BLD AUTO: 0.18 K/UL
EOSINOPHIL NFR BLD AUTO: 1.8 %
GLUCOSE SERPL-MCNC: 107 MG/DL
HCT VFR BLD CALC: 44.5 %
HGB BLD-MCNC: 14 G/DL
IMM GRANULOCYTES NFR BLD AUTO: 0.4 %
LYMPHOCYTES # BLD AUTO: 1.78 K/UL
LYMPHOCYTES NFR BLD AUTO: 17.8 %
MAN DIFF?: NORMAL
MCHC RBC-ENTMCNC: 27.7 PG
MCHC RBC-ENTMCNC: 31.5 GM/DL
MCV RBC AUTO: 87.9 FL
MONOCYTES # BLD AUTO: 0.66 K/UL
MONOCYTES NFR BLD AUTO: 6.6 %
NEUTROPHILS # BLD AUTO: 7.3 K/UL
NEUTROPHILS NFR BLD AUTO: 72.9 %
NT-PROBNP SERPL-MCNC: 111 PG/ML
PLATELET # BLD AUTO: 522 K/UL
POTASSIUM SERPL-SCNC: 4.6 MMOL/L
PROT SERPL-MCNC: 7 G/DL
RBC # BLD: 5.06 M/UL
RBC # FLD: 14.6 %
SODIUM SERPL-SCNC: 140 MMOL/L
TROPONIN I SERPL-MCNC: <0.01 NG/ML
WBC # FLD AUTO: 10.01 K/UL

## 2023-05-02 ENCOUNTER — LABORATORY RESULT (OUTPATIENT)
Age: 68
End: 2023-05-02

## 2023-05-02 ENCOUNTER — APPOINTMENT (OUTPATIENT)
Dept: INTERNAL MEDICINE | Facility: CLINIC | Age: 68
End: 2023-05-02
Payer: COMMERCIAL

## 2023-05-02 VITALS — DIASTOLIC BLOOD PRESSURE: 90 MMHG | SYSTOLIC BLOOD PRESSURE: 160 MMHG

## 2023-05-02 VITALS — SYSTOLIC BLOOD PRESSURE: 138 MMHG | DIASTOLIC BLOOD PRESSURE: 78 MMHG

## 2023-05-02 DIAGNOSIS — R00.0 TACHYCARDIA, UNSPECIFIED: ICD-10-CM

## 2023-05-02 PROCEDURE — 99496 TRANSJ CARE MGMT HIGH F2F 7D: CPT | Mod: 25

## 2023-05-02 PROCEDURE — 36415 COLL VENOUS BLD VENIPUNCTURE: CPT

## 2023-05-02 NOTE — HISTORY OF PRESENT ILLNESS
[de-identified] : This is a 67-year-old patient with a history of follicular lymphoma and hypertension who developed shortness of breath accompanied by pleuritic pain and went to the emergency room where a CTPA was performed that revealed multiple pulmonary emboli.  Patient D-dimers were also very elevated and he was placed on Eliquis as anticoagulation.

## 2023-05-02 NOTE — ASSESSMENT
[FreeTextEntry1] : Problems\par Pulmonary emboli-this was confirmed by the CT pulmonary angiogram which revealed bilateral pulmonary.  The etiology causing the embolus most likely is related to his follicular lymphoma although today I am drawing bloods to check his Leyden factor, prothrombin gene mutation, factor VII factor X and an RENZO.  The patient will continue on Eliquis 5 mg twice daily he was advised as to the risk of bleeding with this particular medication.\par Hypertension -his blood pressure was not well controlled.  Was 160/90.  I increase his metoprolol to 25 mg twice a day and advised that he continue his losartan 100 mg daily and Lasix 20 mg daily and he is to return in 3 weeks\par Follicular lymphoma-this is being followed by his oncologist-

## 2023-05-02 NOTE — HEALTH RISK ASSESSMENT
[0] : 2) Feeling down, depressed, or hopeless: Not at all (0) [PHQ-2 Negative - No further assessment needed] : PHQ-2 Negative - No further assessment needed [Never] : Never [[id number]] : 0

## 2023-05-04 LAB
APTT BLD: 30.9 SEC
BASOPHILS # BLD AUTO: 0.06 K/UL
BASOPHILS NFR BLD AUTO: 0.6 %
DNA PLOIDY SPEC FC-IMP: NORMAL
EOSINOPHIL # BLD AUTO: 0.15 K/UL
EOSINOPHIL NFR BLD AUTO: 1.5 %
FACT VIII ACT/NOR PPP: 184 %
HCT VFR BLD CALC: 43.6 %
HGB BLD-MCNC: 13.5 G/DL
IMM GRANULOCYTES NFR BLD AUTO: 0.3 %
INR PPP: 1.19 RATIO
LYMPHOCYTES # BLD AUTO: 1.8 K/UL
LYMPHOCYTES NFR BLD AUTO: 18.3 %
MAN DIFF?: NORMAL
MCHC RBC-ENTMCNC: 27.6 PG
MCHC RBC-ENTMCNC: 31 GM/DL
MCV RBC AUTO: 89.2 FL
MONOCYTES # BLD AUTO: 0.7 K/UL
MONOCYTES NFR BLD AUTO: 7.1 %
NEUTROPHILS # BLD AUTO: 7.09 K/UL
NEUTROPHILS NFR BLD AUTO: 72.2 %
PLATELET # BLD AUTO: 462 K/UL
PT BLD: 14 SEC
PTR INTERP: NORMAL
RBC # BLD: 4.89 M/UL
RBC # FLD: 14.6 %
WBC # FLD AUTO: 9.83 K/UL

## 2023-05-07 LAB
ALBUMIN MFR SERPL ELPH: 57.7 %
ALBUMIN SERPL-MCNC: 3.9 G/DL
ALBUMIN/GLOB SERPL: 1.3 RATIO
ALPHA1 GLOB MFR SERPL ELPH: 5.7 %
ALPHA1 GLOB SERPL ELPH-MCNC: 0.4 G/DL
ALPHA2 GLOB MFR SERPL ELPH: 13.6 %
ALPHA2 GLOB SERPL ELPH-MCNC: 0.9 G/DL
B-GLOBULIN MFR SERPL ELPH: 15.2 %
B-GLOBULIN SERPL ELPH-MCNC: 1 G/DL
GAMMA GLOB FLD ELPH-MCNC: 0.5 G/DL
GAMMA GLOB MFR SERPL ELPH: 7.8 %
INTERPRETATION SERPL IEP-IMP: NORMAL
PROT SERPL-MCNC: 6.8 G/DL
PROT SERPL-MCNC: 6.8 G/DL

## 2023-05-10 RX ORDER — METOPROLOL SUCCINATE 25 MG/1
25 TABLET, EXTENDED RELEASE ORAL TWICE DAILY
Qty: 180 | Refills: 1 | Status: DISCONTINUED | COMMUNITY
Start: 2021-02-26 | End: 2023-05-10

## 2023-05-17 ENCOUNTER — NON-APPOINTMENT (OUTPATIENT)
Age: 68
End: 2023-05-17

## 2023-05-23 ENCOUNTER — APPOINTMENT (OUTPATIENT)
Dept: INTERNAL MEDICINE | Facility: CLINIC | Age: 68
End: 2023-05-23
Payer: COMMERCIAL

## 2023-05-23 VITALS — DIASTOLIC BLOOD PRESSURE: 82 MMHG | SYSTOLIC BLOOD PRESSURE: 130 MMHG

## 2023-05-23 VITALS — HEIGHT: 69 IN | WEIGHT: 212 LBS | BODY MASS INDEX: 31.4 KG/M2

## 2023-05-23 PROCEDURE — 99214 OFFICE O/P EST MOD 30 MIN: CPT

## 2023-05-23 NOTE — HISTORY OF PRESENT ILLNESS
[de-identified] : This is a 67-year-old gentleman with a history of hypertension, hypercholesterolemia, pulmonary embolus, follicular lymphoma who is here for follow-up visit

## 2023-05-23 NOTE — ASSESSMENT
[FreeTextEntry1] : This is a 67-year-old gentleman who is status post pulmonary embolus and presently is being treated with Eliquis 5 mg twice daily.  In addition on last visit his blood pressure was found to be exceedingly high.  I increased his Coreg from 6-1/4 mg to 12 and half milligrams twice a day in addition to his losartan.  His blood pressure today was 130/82.  In addition he has a history of follicular lymphoma being followed by his oncologist.

## 2023-06-02 ENCOUNTER — APPOINTMENT (OUTPATIENT)
Dept: CARDIOLOGY | Facility: CLINIC | Age: 68
End: 2023-06-02

## 2023-06-04 ENCOUNTER — RX RENEWAL (OUTPATIENT)
Age: 68
End: 2023-06-04

## 2023-06-07 ENCOUNTER — APPOINTMENT (OUTPATIENT)
Dept: CARDIOLOGY | Facility: CLINIC | Age: 68
End: 2023-06-07
Payer: COMMERCIAL

## 2023-06-07 PROCEDURE — 93970 EXTREMITY STUDY: CPT

## 2023-07-20 ENCOUNTER — APPOINTMENT (OUTPATIENT)
Dept: CARDIOLOGY | Facility: CLINIC | Age: 68
End: 2023-07-20
Payer: COMMERCIAL

## 2023-07-20 VITALS
BODY MASS INDEX: 31.4 KG/M2 | HEART RATE: 99 BPM | SYSTOLIC BLOOD PRESSURE: 130 MMHG | DIASTOLIC BLOOD PRESSURE: 90 MMHG | HEIGHT: 69 IN | OXYGEN SATURATION: 97 % | WEIGHT: 212 LBS

## 2023-07-20 PROCEDURE — 99214 OFFICE O/P EST MOD 30 MIN: CPT

## 2023-07-20 NOTE — PHYSICAL EXAM
[General Appearance - Well Developed] : well developed [Normal Appearance] : normal appearance [Well Groomed] : well groomed [General Appearance - Well Nourished] : well nourished [No Deformities] : no deformities [General Appearance - In No Acute Distress] : no acute distress [Normal Jugular Venous A Waves Present] : normal jugular venous A waves present [Normal Jugular Venous V Waves Present] : normal jugular venous V waves present [No Jugular Venous Merritt A Waves] : no jugular venous merritt A waves [Respiration, Rhythm And Depth] : normal respiratory rhythm and effort [Exaggerated Use Of Accessory Muscles For Inspiration] : no accessory muscle use [Auscultation Breath Sounds / Voice Sounds] : lungs were clear to auscultation bilaterally [Heart Rate And Rhythm] : heart rate and rhythm were normal [Heart Sounds] : normal S1 and S2 [Murmurs] : no murmurs present [Abdomen Soft] : soft [Abdomen Tenderness] : non-tender [Abdomen Mass (___ Cm)] : no abdominal mass palpated [Abnormal Walk] : normal gait [Gait - Sufficient For Exercise Testing] : the gait was sufficient for exercise testing [Nail Clubbing] : no clubbing of the fingernails [Cyanosis, Localized] : no localized cyanosis [Petechial Hemorrhages (___cm)] : no petechial hemorrhages [] : no ischemic changes

## 2023-07-20 NOTE — ASSESSMENT
[FreeTextEntry1] : \par Assessment and Recommendation:\par - Assessment	\par Assessment:\par 1. Bilateral PE. Low risk (normal biventricular function, negative trop and\par BNP), Consider unprovoked PE given hx of malignancy\par 2. Superificial DVT\par 3. Hx Non-hodgkin's lymphoma\par 4. Melanoma\par  \par \par \par Plan:\par 1. Cont Eliquis  5 mg BID  \par 2.  US Lyla - chornic L gastroc.  \par 3.  Repeat venous duplex in 3 months\par 4.  Hemeonc is following lymphocele - watchful waiting for this per patient.  \par 5.  If he needs Mohs surgery, he can proceed, hold 6 doses of eliquis prior to the procedure.  Resume eliquis once ok with surgeon.  \par 6.  Next visit 3 months, will plan to reduce to 2.5mg BID at that visit (will likely do venous duplex prior to that visit).

## 2023-07-20 NOTE — REASON FOR VISIT
[FreeTextEntry1] : 7/20/2023\par \par Doing well on coreg\par BP is better controlled at home. \par No bleeding issues. \par No blood in the urine or stool \par legs are fine\par Breathing ok \par Moh's surgery - is pending.  \par \par MEDICATIONS:\par apixaban 5 milliGRAM(s) Oral every 12 hours\par losartan 100 milliGRAM(s) Oral daily\par Carvedilil 12.5mg BID\par lasix 20mg daily \par PARoxetine 20 milliGRAM(s) Oral daily\par \par \par 4/22/2023\par Now on eliquis 5mg BID\par Feels better. \par No bleeding issues\par Left leg is ok, swelling is better\par Breathing is back to normal\par No plueritic chest pain \par No labs since discharge. \par Dr. Gunn is PCP\par He does not have a BP cuff\par  \par MEDICATIONS:\par apixaban 10 milliGRAM(s) Oral every 12 hours\par losartan 100 milliGRAM(s) Oral daily\par metoprolol succinate ER 25 milliGRAM(s) Oral daily\par PARoxetine 20 milliGRAM(s) Oral daily\par lasix 20mg daily \par  \par  \par From hospital stay:\par yo M pmhx non-hodgkins lymphoma, HTN, presents to the ED c/o acute onset SOB,\par worse w/ exertion, and pleuritic R sided CP. Pt was seen here yesterday for 7\par days of LLE swelling and pain, had an US that showed a L great saphenous vein\par extending down to the calf, was dc'd on eliquis (took one dose).\par  \par He has pleuritic R sided chest pain\par  \par Duplex here w superficial vein thrombosis and CTA w bilateral PE.\par  \par Recent travel to [place] 2-4 hrs on plane\par  \par No family hx of thrombosis\par  \par Never smoker\par \par CONCLUSIONS:\par  \par  1. The left ventricular systolic function is normal with an ejection fraction of 60 % by Arechiga's method of disks.\par  2. Right ventricle not visualized on axis; grossly, upper limit of normal right ventricular size with normal right ventricular systolic function. Basal RV diameter~4.1 cm.\par  3. Trace mitral regurgitation.\par  4. Pulmonary artery systolic pressure could not be estimated.\par  5. Focused TTE performed to evaluate biventricular systolic function.\par  6. No prior echocardiogram is available for comparison.\par  7. Technically difficult image quality.\par \par \par IMPRESSION:\par Bilateral segmental and subsegmental pulmonary emboli as above. No evidence of right heart strain. Presumed bibasilar atelectasis and less likely pulmonary infarct.\par Trace right pleural effusion.\par \par Findings were discussed by Dr. Weeks with MD Joanie on 4/18/2023 7:12 AM with read back confirmation.\par \par --- End of Report ---\par \par IMPRESSION:\par No deep venous thrombosis.\par Occlusive superficial venous thrombosis in the left great saphenous vein extending from the thigh to the calf.\par \par --- End of Report ---\par

## 2023-08-22 ENCOUNTER — APPOINTMENT (OUTPATIENT)
Dept: INTERNAL MEDICINE | Facility: CLINIC | Age: 68
End: 2023-08-22
Payer: COMMERCIAL

## 2023-08-22 VITALS — WEIGHT: 212 LBS | HEIGHT: 69 IN | BODY MASS INDEX: 31.4 KG/M2

## 2023-08-22 DIAGNOSIS — F39 UNSPECIFIED MOOD [AFFECTIVE] DISORDER: ICD-10-CM

## 2023-08-22 PROCEDURE — 99214 OFFICE O/P EST MOD 30 MIN: CPT

## 2023-08-22 NOTE — HISTORY OF PRESENT ILLNESS
[de-identified] : This is a 68-year-old gentleman with a history of follicular lymphoma completed by the fact that he has latent factor and developed multiple pulmonary emboli for which she is being treated with Eliquis 5 mg twice a day.  In addition he also has a history of hypercholesterolemia and hypertension.  He is here today for reassessment of his blood pressure

## 2023-08-22 NOTE — ASSESSMENT
[FreeTextEntry1] : This is a 67-year-old gentleman who is status post pulmonary embolus and presently is being treated with Eliquis 5 mg twice daily.  In addition on last visit his blood pressure was found to be exceedingly high.  I increased his Coreg from 6-1/4 mg to 12 and half milligrams twice a day in addition to his losartan.  His blood pressure today was 130/80.  In addition he has a history of follicular lymphoma being followed by his oncologist. Awake

## 2023-08-24 ENCOUNTER — NON-APPOINTMENT (OUTPATIENT)
Age: 68
End: 2023-08-24

## 2023-10-19 ENCOUNTER — APPOINTMENT (OUTPATIENT)
Dept: CARDIOLOGY | Facility: CLINIC | Age: 68
End: 2023-10-19
Payer: COMMERCIAL

## 2023-10-19 VITALS
SYSTOLIC BLOOD PRESSURE: 148 MMHG | BODY MASS INDEX: 31.84 KG/M2 | WEIGHT: 215 LBS | HEIGHT: 69 IN | OXYGEN SATURATION: 98 % | DIASTOLIC BLOOD PRESSURE: 90 MMHG | HEART RATE: 76 BPM

## 2023-10-19 PROCEDURE — 93970 EXTREMITY STUDY: CPT

## 2023-10-19 PROCEDURE — 99214 OFFICE O/P EST MOD 30 MIN: CPT

## 2023-11-20 ENCOUNTER — APPOINTMENT (OUTPATIENT)
Dept: INTERNAL MEDICINE | Facility: CLINIC | Age: 68
End: 2023-11-20

## 2023-12-04 RX ORDER — CARVEDILOL 12.5 MG/1
12.5 TABLET, FILM COATED ORAL
Qty: 180 | Refills: 3 | Status: ACTIVE | COMMUNITY
Start: 2023-05-10 | End: 1900-01-01

## 2024-01-02 NOTE — ED ADULT TRIAGE NOTE - WAS YOUR LAST COVID-19 VACCINE GREATER THAN OR EQUAL TO TWO MONTHS AGO?
[___] : [unfilled] [None] : normal LV function [de-identified] : Atrial fibrillation.  [de-identified] : [facility] ECHO CONCLUSIONS:    1. Technically difficult image quality.  2. Left ventricular systolic function is low-normal with a LVEF estimated at 50-55%.  3. Normal right ventricular cavity size, wall thickness, and systolic function.  4. Mild mitral regurgitation.  5. Mild aortic regurgitation.  6. Trace tricuspid regurgitation.  7. Small pericardial effusion noted adjacent to the posterior left ventricle associated with a large anterior fat pad, without diastolic RV collapse.  8. The inferior vena cava is normal in size measuring 1.33 cm in diameter, (normal <2.1cm) with normal inspiratory collapse (normal >50%) consistent with normal right atrial pressure (~3, range 0-5mmHg).  Yes

## 2024-02-13 ENCOUNTER — RX RENEWAL (OUTPATIENT)
Age: 69
End: 2024-02-13

## 2024-02-13 RX ORDER — LOSARTAN POTASSIUM 100 MG/1
100 TABLET, FILM COATED ORAL DAILY
Qty: 90 | Refills: 1 | Status: ACTIVE | COMMUNITY
Start: 2019-05-10 | End: 1900-01-01

## 2024-02-14 LAB — DEPRECATED D DIMER PPP IA-ACNC: <150 NG/ML DDU

## 2024-02-15 ENCOUNTER — APPOINTMENT (OUTPATIENT)
Dept: CARDIOLOGY | Facility: CLINIC | Age: 69
End: 2024-02-15
Payer: COMMERCIAL

## 2024-02-15 VITALS
OXYGEN SATURATION: 98 % | HEIGHT: 69 IN | BODY MASS INDEX: 31.25 KG/M2 | WEIGHT: 211 LBS | HEART RATE: 91 BPM | DIASTOLIC BLOOD PRESSURE: 90 MMHG | SYSTOLIC BLOOD PRESSURE: 148 MMHG

## 2024-02-15 DIAGNOSIS — I26.99 OTHER PULMONARY EMBOLISM W/OUT ACUTE COR PULMONALE: ICD-10-CM

## 2024-02-15 LAB
CHOLEST SERPL-MCNC: 217 MG/DL
HDLC SERPL-MCNC: 39 MG/DL
LDLC SERPL CALC-MCNC: 140 MG/DL
NONHDLC SERPL-MCNC: 177 MG/DL
TRIGL SERPL-MCNC: 207 MG/DL

## 2024-02-15 PROCEDURE — 99214 OFFICE O/P EST MOD 30 MIN: CPT

## 2024-02-15 RX ORDER — APIXABAN 2.5 MG/1
2.5 TABLET, FILM COATED ORAL
Qty: 180 | Refills: 3 | Status: ACTIVE | COMMUNITY
Start: 2023-04-27 | End: 1900-01-01

## 2024-02-15 NOTE — ASSESSMENT
[FreeTextEntry1] : Assessment and Recommendation: - Assessment   1. Bilateral PE. Low risk (normal biventricular function, negative trop and BNP), Consider unprovoked PE given hx of malignancy 2. Superificial DVT 3. Hx Non-hodgkin's lymphoma 4. Melanoma   Plan: 1.   dimer is normal, once he returns from [place] (next weeki)  will decrease to eliquis 2.5mg BID and repeat dimer  6 weeks while on the lower dose of eliquis 2.  Return in 3 months.  At that time, we will consider the addition of a statin (athero in aorta, HTN, HLD). 3.  Healthy diet and lifestyle 4.  Weight loss 5.  Oncology followup

## 2024-02-15 NOTE — REASON FOR VISIT
[Follow-Up - Clinic] : a clinic follow-up of [FreeTextEntry1] : 2/15/2024 DImer negative LDL was 140 recently  Doing well no CP or SOB Not really adherent to diet   on eliquis   10/19/2023 Venous duplex today no DVT on eliquis 5mg BID doing well He does report he is dealing with a colitis flare today CT showed athero in aorta prior LDL above 100 [facility] [place] - no cancer    7/20/2023  Doing well on coreg BP is better controlled at home.  No bleeding issues.  No blood in the urine or stool  legs are fine Breathing ok  [facility] [place] - is pending.    MEDICATIONS: apixaban 5 milliGRAM(s) Oral every 12 hours losartan 100 milliGRAM(s) Oral daily Carvedilil 12.5mg BID lasix 20mg daily  PARoxetine 20 milliGRAM(s) Oral daily   4/22/2023 Now on eliquis 5mg BID Feels better.  No bleeding issues Left leg is ok, swelling is better Breathing is back to normal No plueritic chest pain  No labs since discharge.  Dr. Gunn is PCP He does not have a BP cuff   MEDICATIONS: apixaban 10 milliGRAM(s) Oral every 12 hours losartan 100 milliGRAM(s) Oral daily metoprolol succinate ER 25 milliGRAM(s) Oral daily PARoxetine 20 milliGRAM(s) Oral daily lasix 20mg daily      From hospital stay: yo M pmhx non-hodgkins lymphoma, HTN, presents to the ED c/o acute onset SOB, worse w/ exertion, and pleuritic R sided CP. Pt was seen here yesterday for 7 days of LLE swelling and pain, had an US that showed a L great saphenous vein extending down to the calf, was dc'd on eliquis (took one dose).   He has pleuritic R sided chest pain   Duplex here w superficial vein thrombosis and CTA w bilateral PE.   Recent travel to [place] 2-4 hrs on plane   No family hx of thrombosis   Never smoker  CONCLUSIONS:    1. The left ventricular systolic function is normal with an ejection fraction of 60 % by Arechiga's method of disks.  2. Right ventricle not visualized on axis; grossly, upper limit of normal right ventricular size with normal right ventricular systolic function. Basal RV diameter~4.1 cm.  3. Trace mitral regurgitation.  4. Pulmonary artery systolic pressure could not be estimated.  5. Focused TTE performed to evaluate biventricular systolic function.  6. No prior echocardiogram is available for comparison.  7. Technically difficult image quality.   IMPRESSION: Bilateral segmental and subsegmental pulmonary emboli as above. No evidence of right heart strain. Presumed bibasilar atelectasis and less likely pulmonary infarct. Trace right pleural effusion.  Findings were discussed by Dr. Weeks with MD Joanie on 4/18/2023 7:12 AM with read back confirmation.  --- End of Report ---  IMPRESSION: No deep venous thrombosis. Occlusive superficial venous thrombosis in the left great saphenous vein extending from the thigh to the calf.  --- End of Report ---

## 2024-03-17 ENCOUNTER — RX RENEWAL (OUTPATIENT)
Age: 69
End: 2024-03-17

## 2024-03-25 ENCOUNTER — RX RENEWAL (OUTPATIENT)
Age: 69
End: 2024-03-25

## 2024-04-08 LAB — DEPRECATED D DIMER PPP IA-ACNC: <150 NG/ML DDU

## 2024-04-16 ENCOUNTER — LABORATORY RESULT (OUTPATIENT)
Age: 69
End: 2024-04-16

## 2024-04-16 ENCOUNTER — NON-APPOINTMENT (OUTPATIENT)
Age: 69
End: 2024-04-16

## 2024-04-16 ENCOUNTER — APPOINTMENT (OUTPATIENT)
Dept: INTERNAL MEDICINE | Facility: CLINIC | Age: 69
End: 2024-04-16
Payer: COMMERCIAL

## 2024-04-16 VITALS — HEIGHT: 69 IN | WEIGHT: 210 LBS | BODY MASS INDEX: 31.1 KG/M2

## 2024-04-16 DIAGNOSIS — Z00.00 ENCOUNTER FOR GENERAL ADULT MEDICAL EXAMINATION W/OUT ABNORMAL FINDINGS: ICD-10-CM

## 2024-04-16 DIAGNOSIS — C82.90 FOLLICULAR LYMPHOMA, UNSPECIFIED, UNSPECIFIED SITE: ICD-10-CM

## 2024-04-16 PROCEDURE — 99397 PER PM REEVAL EST PAT 65+ YR: CPT

## 2024-04-16 PROCEDURE — 36415 COLL VENOUS BLD VENIPUNCTURE: CPT

## 2024-04-16 PROCEDURE — 93000 ELECTROCARDIOGRAM COMPLETE: CPT

## 2024-04-16 RX ORDER — FUROSEMIDE 20 MG/1
20 TABLET ORAL
Qty: 90 | Refills: 3 | Status: ACTIVE | COMMUNITY
Start: 2023-03-02 | End: 1900-01-01

## 2024-04-16 NOTE — ASSESSMENT
[FreeTextEntry1] : Problems Follicular lymphoma Hypercholesterolemia Pulmonary embolus  DVT His blood pressure and physical examination were normal except for his weight.  His EKG was normal .  I advised the patient to lose weight.  Of note is that her recent PSA performed by his urologist was 1

## 2024-04-16 NOTE — HISTORY OF PRESENT ILLNESS
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  He was hospitalized where a CT pulmonary angiogram revealed multiple pulmonary emboli his CT T of the abdomen and pelvis did not reveal any recurrence of his cancer but did reveal a chronic lymphocele.  He presently is being treated with Eliquis 2-1/2 mg twice a day

## 2024-04-16 NOTE — HEALTH RISK ASSESSMENT
[No] : No [Never (0 pts)] : Never (0 points) [No falls in past year] : Patient reported no falls in the past year [0] : 2) Feeling down, depressed, or hopeless: Not at all (0) [PHQ-2 Negative - No further assessment needed] : PHQ-2 Negative - No further assessment needed [Fully functional (bathing, dressing, toileting, transferring, walking, feeding)] : Fully functional (bathing, dressing, toileting, transferring, walking, feeding) [Fully functional (using the telephone, shopping, preparing meals, housekeeping, doing laundry, using] : Fully functional and needs no help or supervision to perform IADLs (using the telephone, shopping, preparing meals, housekeeping, doing laundry, using transportation, managing medications and managing finances) [Never] : Never [[id number]] : 0

## 2024-04-16 NOTE — PHYSICAL EXAM
[Normal] : soft, non-tender, non-distended, no masses palpated, no HSM and normal bowel sounds [Normal Sphincter Tone] : normal sphincter tone [No Mass] : no mass [Testes Tenderness] : no tenderness of the testes [Testes Mass (___cm)] : there were no testicular masses [Rectal Exam - Rectum] : digital rectal exam was normal [Prostate Enlargement] : the prostate was not enlarged [Prostate Tenderness] : the prostate was not tender [No Prostate Nodules] : no prostate nodules

## 2024-05-07 DIAGNOSIS — E83.52 HYPERCALCEMIA: ICD-10-CM

## 2024-05-07 DIAGNOSIS — E53.8 DEFICIENCY OF OTHER SPECIFIED B GROUP VITAMINS: ICD-10-CM

## 2024-05-07 LAB
25(OH)D3 SERPL-MCNC: 19 NG/ML
ALBUMIN SERPL ELPH-MCNC: 4.6 G/DL
ALP BLD-CCNC: 83 U/L
ALT SERPL-CCNC: 24 U/L
ANION GAP SERPL CALC-SCNC: 11 MMOL/L
APPEARANCE: CLEAR
AST SERPL-CCNC: 19 U/L
BACTERIA: NEGATIVE /HPF
BASOPHILS # BLD AUTO: 0.04 K/UL
BASOPHILS NFR BLD AUTO: 0.4 %
BILIRUB SERPL-MCNC: 0.2 MG/DL
BILIRUBIN URINE: NEGATIVE
BLOOD URINE: NEGATIVE
BUN SERPL-MCNC: 17 MG/DL
CALCIUM SERPL-MCNC: 11.3 MG/DL
CAST: 1 /LPF
CHLORIDE SERPL-SCNC: 102 MMOL/L
CO2 SERPL-SCNC: 28 MMOL/L
COLOR: YELLOW
CREAT SERPL-MCNC: 1.16 MG/DL
EGFR: 69 ML/MIN/1.73M2
EOSINOPHIL # BLD AUTO: 0.21 K/UL
EOSINOPHIL NFR BLD AUTO: 2.3 %
EPITHELIAL CELLS: 0 /HPF
ESTIMATED AVERAGE GLUCOSE: 120 MG/DL
FERRITIN SERPL-MCNC: 72 NG/ML
FOLATE SERPL-MCNC: 4.2 NG/ML
GLUCOSE QUALITATIVE U: NEGATIVE MG/DL
GLUCOSE SERPL-MCNC: 99 MG/DL
HBA1C MFR BLD HPLC: 5.8 %
HCT VFR BLD CALC: 44.8 %
HGB BLD-MCNC: 15 G/DL
IMM GRANULOCYTES NFR BLD AUTO: 0.3 %
KETONES URINE: NEGATIVE MG/DL
LEUKOCYTE ESTERASE URINE: NEGATIVE
LYMPHOCYTES # BLD AUTO: 2.04 K/UL
LYMPHOCYTES NFR BLD AUTO: 22.2 %
MAN DIFF?: NORMAL
MCHC RBC-ENTMCNC: 28.6 PG
MCHC RBC-ENTMCNC: 33.5 GM/DL
MCV RBC AUTO: 85.3 FL
MICROSCOPIC-UA: NORMAL
MONOCYTES # BLD AUTO: 0.82 K/UL
MONOCYTES NFR BLD AUTO: 8.9 %
NEUTROPHILS # BLD AUTO: 6.06 K/UL
NEUTROPHILS NFR BLD AUTO: 65.9 %
NITRITE URINE: NEGATIVE
PH URINE: 6.5
PLATELET # BLD AUTO: 300 K/UL
POTASSIUM SERPL-SCNC: 4.5 MMOL/L
PROT SERPL-MCNC: 6.9 G/DL
PROTEIN URINE: NEGATIVE MG/DL
PSA SERPL-MCNC: 1.29 NG/ML
RBC # BLD: 5.25 M/UL
RBC # FLD: 13.7 %
RED BLOOD CELLS URINE: 0 /HPF
SODIUM SERPL-SCNC: 142 MMOL/L
SPECIFIC GRAVITY URINE: 1.02
T3RU NFR SERPL: 1.2 TBI
T4 SERPL-MCNC: 7.8 UG/DL
TSH SERPL-ACNC: 2.52 UIU/ML
URATE SERPL-MCNC: 8.1 MG/DL
UROBILINOGEN URINE: 0.2 MG/DL
VIT B12 SERPL-MCNC: 413 PG/ML
WBC # FLD AUTO: 9.2 K/UL
WHITE BLOOD CELLS URINE: 1 /HPF

## 2024-05-16 ENCOUNTER — APPOINTMENT (OUTPATIENT)
Dept: CARDIOLOGY | Facility: CLINIC | Age: 69
End: 2024-05-16
Payer: COMMERCIAL

## 2024-05-16 VITALS
OXYGEN SATURATION: 97 % | HEIGHT: 69 IN | SYSTOLIC BLOOD PRESSURE: 130 MMHG | BODY MASS INDEX: 31.25 KG/M2 | DIASTOLIC BLOOD PRESSURE: 92 MMHG | WEIGHT: 211 LBS | HEART RATE: 92 BPM

## 2024-05-16 DIAGNOSIS — E78.00 PURE HYPERCHOLESTEROLEMIA, UNSPECIFIED: ICD-10-CM

## 2024-05-16 DIAGNOSIS — I10 ESSENTIAL (PRIMARY) HYPERTENSION: ICD-10-CM

## 2024-05-16 DIAGNOSIS — I26.99 OTHER PULMONARY EMBOLISM W/OUT ACUTE COR PULMONALE: ICD-10-CM

## 2024-05-16 PROCEDURE — 99214 OFFICE O/P EST MOD 30 MIN: CPT

## 2024-05-16 PROCEDURE — G2211 COMPLEX E/M VISIT ADD ON: CPT | Mod: NC,1L

## 2024-05-16 RX ORDER — ROSUVASTATIN CALCIUM 5 MG/1
5 TABLET, FILM COATED ORAL
Qty: 90 | Refills: 3 | Status: ACTIVE | COMMUNITY
Start: 2024-05-16 | End: 1900-01-01

## 2024-05-16 NOTE — ASSESSMENT
[FreeTextEntry1] : Assessment and Recommendation: - Assessment   1. Bilateral PE. Low risk (normal biventricular function, negative trop and BNP), Consider unprovoked PE given hx of malignancy 2. Superificial DVT 3. Hx Non-hodgkin's lymphoma 4. Melanoma   Plan: 1.   Continue eliquis 2.5mg BID - for extended therapy 2.  Return in 6 months.  3.  Start statin - crestor 5mg daily  (athero in aorta, HTN, HLD). 3.  Healthy diet and lifestyle 4.  Weight loss 5.  Oncology followup

## 2024-05-16 NOTE — REASON FOR VISIT
[Follow-Up - Clinic] : a clinic follow-up of [FreeTextEntry1] : 5/16  Since last visit patient reports doing well on lower dose of elqiuis 2.5mg BID no bleeding issues no Chest pains no palps.    Last D-dimer normal on 3/26.  2/15/2024 DImer negative LDL was 140 recently  Doing well no CP or SOB Not really adherent to diet   on eliquis   10/19/2023 Venous duplex today no DVT on eliquis 5mg BID doing well He does report he is dealing with a colitis flare today CT showed athero in aorta prior LDL above 100 [place] [facility] [place] - no cancer    7/20/2023  Doing well on coreg BP is better controlled at home.  No bleeding issues.  No blood in the urine or stool  legs are fine Breathing ok  [facility] [place] - is pending.    MEDICATIONS: apixaban 5 milliGRAM(s) Oral every 12 hours losartan 100 milliGRAM(s) Oral daily Carvedilil 12.5mg BID lasix 20mg daily  PARoxetine 20 milliGRAM(s) Oral daily   4/22/2023 Now on eliquis 5mg BID Feels better.  No bleeding issues Left leg is ok, swelling is better Breathing is back to normal No plueritic chest pain  No labs since discharge.  Dr. Gunn is PCP He does not have a BP cuff   MEDICATIONS: apixaban 10 milliGRAM(s) Oral every 12 hours losartan 100 milliGRAM(s) Oral daily metoprolol succinate ER 25 milliGRAM(s) Oral daily PARoxetine 20 milliGRAM(s) Oral daily lasix 20mg daily      From hospital stay: yo M pmhx non-hodgkins lymphoma, HTN, presents to the ED c/o acute onset SOB, worse w/ exertion, and pleuritic R sided CP. Pt was seen here yesterday for 7 days of LLE swelling and pain, had an US that showed a L great saphenous vein extending down to the calf, was dc'd on eliquis (took one dose).   He has pleuritic R sided chest pain   Duplex here w superficial vein thrombosis and CTA w bilateral PE.   Recent travel to [place] 2-4 hrs on plane   No family hx of thrombosis   Never smoker  CONCLUSIONS:    1. The left ventricular systolic function is normal with an ejection fraction of 60 % by Arechiga's method of disks.  2. Right ventricle not visualized on axis; grossly, upper limit of normal right ventricular size with normal right ventricular systolic function. Basal RV diameter~4.1 cm.  3. Trace mitral regurgitation.  4. Pulmonary artery systolic pressure could not be estimated.  5. Focused TTE performed to evaluate biventricular systolic function.  6. No prior echocardiogram is available for comparison.  7. Technically difficult image quality.   IMPRESSION: Bilateral segmental and subsegmental pulmonary emboli as above. No evidence of right heart strain. Presumed bibasilar atelectasis and less likely pulmonary infarct. Trace right pleural effusion.  Findings were discussed by Dr. Weeks with MD Joanie on 4/18/2023 7:12 AM with read back confirmation.  --- End of Report ---  IMPRESSION: No deep venous thrombosis. Occlusive superficial venous thrombosis in the left great saphenous vein extending from the thigh to the calf.  --- End of Report ---

## 2024-08-12 ENCOUNTER — RX RENEWAL (OUTPATIENT)
Age: 69
End: 2024-08-12

## 2024-11-21 ENCOUNTER — APPOINTMENT (OUTPATIENT)
Dept: CARDIOLOGY | Facility: CLINIC | Age: 69
End: 2024-11-21
Payer: COMMERCIAL

## 2024-11-21 ENCOUNTER — APPOINTMENT (OUTPATIENT)
Dept: CARDIOLOGY | Facility: CLINIC | Age: 69
End: 2024-11-21

## 2024-11-21 ENCOUNTER — NON-APPOINTMENT (OUTPATIENT)
Age: 69
End: 2024-11-21

## 2024-11-21 VITALS
WEIGHT: 211 LBS | DIASTOLIC BLOOD PRESSURE: 100 MMHG | HEART RATE: 80 BPM | SYSTOLIC BLOOD PRESSURE: 140 MMHG | OXYGEN SATURATION: 98 % | BODY MASS INDEX: 31.16 KG/M2

## 2024-11-21 DIAGNOSIS — I26.99 OTHER PULMONARY EMBOLISM W/OUT ACUTE COR PULMONALE: ICD-10-CM

## 2024-11-21 DIAGNOSIS — E78.00 PURE HYPERCHOLESTEROLEMIA, UNSPECIFIED: ICD-10-CM

## 2024-11-21 PROCEDURE — G2211 COMPLEX E/M VISIT ADD ON: CPT | Mod: NC

## 2024-11-21 PROCEDURE — 99214 OFFICE O/P EST MOD 30 MIN: CPT

## 2024-12-16 ENCOUNTER — APPOINTMENT (OUTPATIENT)
Dept: OPHTHALMOLOGY | Facility: CLINIC | Age: 69
End: 2024-12-16

## 2025-01-22 ENCOUNTER — RX RENEWAL (OUTPATIENT)
Age: 70
End: 2025-01-22

## 2025-02-19 ENCOUNTER — RX RENEWAL (OUTPATIENT)
Age: 70
End: 2025-02-19

## 2025-06-16 ENCOUNTER — RX RENEWAL (OUTPATIENT)
Age: 70
End: 2025-06-16

## 2025-06-17 ENCOUNTER — RX RENEWAL (OUTPATIENT)
Age: 70
End: 2025-06-17